# Patient Record
Sex: FEMALE | Race: WHITE | NOT HISPANIC OR LATINO | Employment: OTHER | URBAN - METROPOLITAN AREA
[De-identification: names, ages, dates, MRNs, and addresses within clinical notes are randomized per-mention and may not be internally consistent; named-entity substitution may affect disease eponyms.]

---

## 2017-01-17 ENCOUNTER — OFFICE VISIT (OUTPATIENT)
Dept: SPORTS MEDICINE | Facility: CLINIC | Age: 52
End: 2017-01-17

## 2017-01-17 VITALS
HEART RATE: 90 BPM | TEMPERATURE: 98.3 F | OXYGEN SATURATION: 97 % | SYSTOLIC BLOOD PRESSURE: 120 MMHG | DIASTOLIC BLOOD PRESSURE: 80 MMHG | BODY MASS INDEX: 29.71 KG/M2 | WEIGHT: 174 LBS | HEIGHT: 64 IN

## 2017-01-17 DIAGNOSIS — M75.42 SHOULDER IMPINGEMENT, LEFT: Primary | ICD-10-CM

## 2017-01-17 PROCEDURE — 20610 DRAIN/INJ JOINT/BURSA W/O US: CPT | Performed by: FAMILY MEDICINE

## 2017-01-17 PROCEDURE — 99213 OFFICE O/P EST LOW 20 MIN: CPT | Performed by: FAMILY MEDICINE

## 2017-01-17 PROCEDURE — 73030 X-RAY EXAM OF SHOULDER: CPT | Performed by: FAMILY MEDICINE

## 2017-01-17 RX ORDER — TRIAMCINOLONE ACETONIDE 40 MG/ML
40 INJECTION, SUSPENSION INTRA-ARTICULAR; INTRAMUSCULAR ONCE
Status: COMPLETED | OUTPATIENT
Start: 2017-01-17 | End: 2017-01-17

## 2017-01-17 RX ADMIN — TRIAMCINOLONE ACETONIDE 40 MG: 40 INJECTION, SUSPENSION INTRA-ARTICULAR; INTRAMUSCULAR at 16:23

## 2017-01-17 NOTE — MR AVS SNAPSHOT
Lynn MATTHEWS Speaker   1/17/2017 3:40 PM   Office Visit    Dept Phone:  [phone]   Encounter #:  [id number]    Provider:  Nolberto Colón MD   Department:  [facility]                Your Full Care Plan              Your Updated Medication List          This list is accurate as of: 1/17/17  4:14 PM.  Always use your most recent med list.                buPROPion  MG 24 hr tablet   Commonly known as:  WELLBUTRIN XL   TAKE 1 TABLET BY MOUTH EVERY MORNING.       citalopram 40 MG tablet   Commonly known as:  CeleXA   TAKE 1 TABLET DAILY.       zolpidem 10 MG tablet   Commonly known as:  AMBIEN   TAKE 1 TABLET BY MOUTH AT BEDTIME AS NEEDED FOR SLEEP               We Performed the Following     XR Shoulder 2+ View Left       You Were Diagnosed With        Codes Comments    Chronic left shoulder pain    -  Primary ICD-10-CM: M25.512, G89.29  ICD-9-CM: 719.41, 338.29       Instructions     None    Patient Instructions History      Upcoming Appointments     Visit Type Date Time Department    NEW PT - SPORTS MEDICINE 1/17/2017  3:40 PM [facility] [place], [place] SPORTS MED [facility]      [employer] Signup     Our records indicate that you have an active [employer] account.    You can view your After Visit Summary by going to [employer] and logging in with your [employer] username and password.  If you don't have a [employer] username and password but a parent or guardian has access to your record, the parent or guardian should login with their own [employer] username and password and access your record to view the After Visit Summary.    If you have questions, you can email [email or web address] or call [phone] to talk to our [employer] staff.  Remember, [employer] is NOT to be used for urgent needs.  For medical emergencies, dial 911.               Other Info from Your Visit           Allergies     No Known Drug Allergy        Reason for Visit     "Establish Care left shoulder cuff; pain;       Vital Signs     Blood Pressure Pulse Temperature Height Weight Oxygen Saturation    120/80 (BP Location: Left arm, Patient Position: Sitting, Cuff Size: Adult) 90 98.3 °F (36.8 °C) (Oral) 64\" (162.6 cm) 174 lb (78.9 kg) 97%    Body Mass Index Smoking Status                29.87 kg/m2 Never Smoker          Problems and Diagnoses Noted     Chronic left shoulder pain    -  Primary        "

## 2017-01-17 NOTE — PROGRESS NOTES
"Subjective   Lynn R Speaker is a 51 y.o. female.   CC: L shoulder pain  History of Present Illness   1.  October 2016, L shoulder pain. NKT but may have happened working in the yard, started hurting the next day. Dull pain. Pain if sleeps on tbhe L side. Advil helps some. No neck pain or paresthesias.     I have reviewed the patient's medical history in detail and updated the computerized patient record.        Review of Systems   Constitutional: Negative for fever.   Musculoskeletal:        Per HPI   Skin: Negative for wound.   Neurological: Negative for numbness.   All other systems reviewed and are negative.    Visit Vitals   • /80 (BP Location: Left arm, Patient Position: Sitting, Cuff Size: Adult)   • Pulse 90   • Temp 98.3 °F (36.8 °C) (Oral)   • Ht 64\" (162.6 cm)   • Wt 174 lb (78.9 kg)   • SpO2 97%   • BMI 29.87 kg/m2       Objective   Physical Exam   Constitutional: She is oriented to person, place, and time. She appears well-developed and well-nourished.   HENT:   Head: Normocephalic and atraumatic.   Eyes: Conjunctivae and EOM are normal. Pupils are equal, round, and reactive to light.   Cardiovascular:   No peripheral edema   Pulmonary/Chest: Effort normal.   Musculoskeletal:   Left shoulder normal in general appearance.  Patient has some mild tenderness to palpation over the infraspinatus footprint.  Also some mild to his palpation of the subacromial bursa.  Positive Neer and Cheek.  Negative [place].  Negative sulcus.  No tenderness to palpation of the biceps tendon.  Patient has full range of motion but begins to get pain over the superior lateral shoulder with abduction past 90°.  Motor 5 out of 5.  No pain with resisted external rotation.   Neurological: She is alert and oriented to person, place, and time.   Skin: Skin is warm and dry.   Psychiatric: She has a normal mood and affect. Her behavior is normal.   Vitals reviewed.  Left Shoulder X-Ray  Indication: Pain  AP Internal and " "External Rotation views    Findings:  No fracture  No bony lesion  Normal soft tissues  Normal joint spaces, mild acromioclavicular arthritic changes.    No prior studies were available for comparison.    Shoulder Injection Procedure Note    Left shoulder subacromial bursa injection was discussed with the patient in detail, including indication, risks, benefits, and alternatives. Verbal consent was given for the procedure.  Injection site was identified by physical examination and cleaned with Betadine and alcohol swabs. Prior to needle insertion, ethyl chloride spray was used for surface anesthesia. Sterile technique was used.  A 25-gauge, 1.5\" needle was directed to the joint from a(n) posterior approach. Injectate was passed into the subacromial bursa without difficulty. The needle was removed and a simple bandage was applied. The procedure was tolerated well without difficulty.    Injection mixture:  1% lidocaine without epinephrine: 3 mL    40 mg/mL triamcinolone acetonide: 1 mL    Assessment/Plan   Lynn was seen today for establish care.    Diagnoses and all orders for this visit:    Shoulder impingement, left  -     XR Shoulder 2+ View Left        Postinjection instructions regarding ice and activity was discussed with the patient.  Patient also given home exercise program.  Follow-up 3 weeks if no improvement.       "

## 2017-02-07 ENCOUNTER — OFFICE VISIT (OUTPATIENT)
Dept: FAMILY MEDICINE CLINIC | Facility: CLINIC | Age: 52
End: 2017-02-07

## 2017-02-07 VITALS
OXYGEN SATURATION: 96 % | BODY MASS INDEX: 29.18 KG/M2 | SYSTOLIC BLOOD PRESSURE: 112 MMHG | TEMPERATURE: 98.4 F | DIASTOLIC BLOOD PRESSURE: 80 MMHG | HEART RATE: 89 BPM | RESPIRATION RATE: 16 BRPM | WEIGHT: 170 LBS

## 2017-02-07 DIAGNOSIS — J10.1 INFLUENZA A: ICD-10-CM

## 2017-02-07 DIAGNOSIS — R68.89 FLU-LIKE SYMPTOMS: Primary | ICD-10-CM

## 2017-02-07 LAB
EXPIRATION DATE: ABNORMAL
FLUAV AG NPH QL: POSITIVE
FLUBV AG NPH QL: NEGATIVE
INTERNAL CONTROL: ABNORMAL
Lab: ABNORMAL

## 2017-02-07 PROCEDURE — 99213 OFFICE O/P EST LOW 20 MIN: CPT | Performed by: NURSE PRACTITIONER

## 2017-02-07 PROCEDURE — 87804 INFLUENZA ASSAY W/OPTIC: CPT | Performed by: NURSE PRACTITIONER

## 2017-02-07 RX ORDER — OSELTAMIVIR PHOSPHATE 75 MG/1
75 CAPSULE ORAL 2 TIMES DAILY
Qty: 10 CAPSULE | Refills: 0 | Status: SHIPPED | OUTPATIENT
Start: 2017-02-07 | End: 2017-02-12

## 2017-02-07 NOTE — PROGRESS NOTES
Dima Gomez is a 51 y.o. female.   Chief Complaint   Patient presents with   • Flu Symptoms     x 3 days, n/v, fatigue, hot flashes     Vitals:    02/07/17 0932   BP: 112/80   Pulse: 89   Resp: 16   Temp: 98.4 °F (36.9 °C)   SpO2: 96%     Allergies   Allergen Reactions   • No Known Drug Allergy      HPI Comments: Did not get flu shot. Has had sick contacts    URI    This is a new problem. The current episode started in the past 7 days (day three today). The problem has been gradually worsening. The maximum temperature recorded prior to her arrival was 100.4 - 100.9 F. Associated symptoms include congestion, coughing (non-productive), headaches, joint pain (achy), nausea and sinus pain. Pertinent negatives include no sore throat. She has tried decongestant, acetaminophen and NSAIDs for the symptoms. The treatment provided moderate relief.        The following portions of the patient's history were reviewed and updated as appropriate: allergies, current medications, past family history, past medical history, past social history, past surgical history and problem list.    Review of Systems   Constitutional: Positive for chills, diaphoresis, fatigue and fever.   HENT: Positive for congestion and sinus pressure. Negative for sore throat.    Respiratory: Positive for cough (non-productive).    Gastrointestinal: Positive for nausea.   Musculoskeletal: Positive for joint pain (achy) and myalgias (achy).   Neurological: Positive for headaches.   All other systems reviewed and are negative.      Objective   Physical Exam   Constitutional: She is oriented to person, place, and time. She appears well-developed and well-nourished.   HENT:   Head: Normocephalic and atraumatic.   Right Ear: External ear normal. No tenderness. Tympanic membrane is bulging. Tympanic membrane is not erythematous.   Left Ear: External ear normal. No tenderness. Tympanic membrane is bulging. Tympanic membrane is not erythematous.    Mouth/Throat: Oropharynx is clear and moist.   Neck: Normal range of motion. Neck supple.   Cardiovascular: Normal rate, regular rhythm and normal heart sounds.    Pulmonary/Chest: Effort normal and breath sounds normal.   Musculoskeletal: Normal range of motion.   Neurological: She is alert and oriented to person, place, and time.   Skin: Skin is warm and dry.   Psychiatric: She has a normal mood and affect. Her behavior is normal. Judgment and thought content normal.   Nursing note and vitals reviewed.      Assessment/Plan   Problems Addressed this Visit     None      Visit Diagnoses     Flu-like symptoms    -  Primary    Relevant Orders    POC Influenza A / B (Completed)    Influenza A        Relevant Medications    oseltamivir (TAMIFLU) 75 MG capsule        Results for orders placed or performed in visit on 02/07/17   POC Influenza A / B   Result Value Ref Range    Rapid Influenza A Ag Positive     Rapid Influenza B Ag Negative     Internal Control Passed Passed    Lot Number [id number]     Expiration Date 2017-07-31

## 2017-02-08 ENCOUNTER — TRANSCRIBE ORDERS (OUTPATIENT)
Dept: ADMINISTRATIVE | Facility: HOSPITAL | Age: 52
End: 2017-02-08

## 2017-02-08 DIAGNOSIS — Z13.9 SCREENING: Primary | ICD-10-CM

## 2017-06-03 DIAGNOSIS — F41.9 ANXIETY AND DEPRESSION: ICD-10-CM

## 2017-06-03 DIAGNOSIS — F32.A ANXIETY AND DEPRESSION: ICD-10-CM

## 2017-06-05 RX ORDER — BUPROPION HYDROCHLORIDE 150 MG/1
TABLET ORAL
Qty: 30 TABLET | Refills: 1 | Status: SHIPPED | OUTPATIENT
Start: 2017-06-05 | End: 2017-07-12 | Stop reason: SDUPTHER

## 2017-06-12 DIAGNOSIS — F41.9 ANXIETY AND DEPRESSION: ICD-10-CM

## 2017-06-12 DIAGNOSIS — F32.A ANXIETY AND DEPRESSION: ICD-10-CM

## 2017-06-12 RX ORDER — CITALOPRAM 40 MG/1
TABLET ORAL
Qty: 30 TABLET | Refills: 6 | Status: SHIPPED | OUTPATIENT
Start: 2017-06-12 | End: 2017-07-12 | Stop reason: SDUPTHER

## 2017-07-12 DIAGNOSIS — F41.9 ANXIETY AND DEPRESSION: ICD-10-CM

## 2017-07-12 DIAGNOSIS — F32.A ANXIETY AND DEPRESSION: ICD-10-CM

## 2017-07-12 RX ORDER — CITALOPRAM 40 MG/1
40 TABLET ORAL DAILY
Qty: 90 TABLET | Refills: 1 | Status: SHIPPED | OUTPATIENT
Start: 2017-07-12 | End: 2017-08-31 | Stop reason: SDUPTHER

## 2017-07-12 RX ORDER — BUPROPION HYDROCHLORIDE 150 MG/1
150 TABLET ORAL EVERY MORNING
Qty: 90 TABLET | Refills: 1 | Status: SHIPPED | OUTPATIENT
Start: 2017-07-12 | End: 2017-08-31

## 2017-07-13 ENCOUNTER — OFFICE VISIT (OUTPATIENT)
Dept: FAMILY MEDICINE CLINIC | Facility: CLINIC | Age: 52
End: 2017-07-13

## 2017-07-13 VITALS
OXYGEN SATURATION: 96 % | SYSTOLIC BLOOD PRESSURE: 120 MMHG | HEIGHT: 64 IN | RESPIRATION RATE: 16 BRPM | BODY MASS INDEX: 28.34 KG/M2 | TEMPERATURE: 98.5 F | HEART RATE: 86 BPM | DIASTOLIC BLOOD PRESSURE: 84 MMHG | WEIGHT: 166 LBS

## 2017-07-13 DIAGNOSIS — F51.01 PRIMARY INSOMNIA: Primary | ICD-10-CM

## 2017-07-13 DIAGNOSIS — F32.A ANXIETY AND DEPRESSION: ICD-10-CM

## 2017-07-13 DIAGNOSIS — K14.9 TONGUE IRRITATION: ICD-10-CM

## 2017-07-13 DIAGNOSIS — F41.9 ANXIETY AND DEPRESSION: ICD-10-CM

## 2017-07-13 PROCEDURE — 99213 OFFICE O/P EST LOW 20 MIN: CPT | Performed by: PHYSICIAN ASSISTANT

## 2017-07-13 NOTE — PROGRESS NOTES
Subjective   Lynn Gomez is a 52 y.o. female.   Chief Complaint   Patient presents with   • Insomnia       History of Present Illness     Lynn is a 52 year old female who presents for follow up on insomnia management.Lynn has lost 4 pounds since February 2017. States she has not been taking her Ambien medication due to making her forgetful.  Lynn has had a burning sensation on her tongue for the past month or so. Denied any smoking history.  Denied any sore throat,post nasal dip,sinus pressure or sores in mouth/tongues.  Brushes teeth once-twice a day.   States she has not sensitivity to cold/hot/foods.  .  Last dental exam was 6  months ago.   Diet has been normal. Sleep has been restless.  She has tried OTC Melatonin without relief of symptoms.  She has only been prescribed Ambien for insomnia.  Lynn is doing well with the Celexa and Wellbutrin medications.  Denied any suicidal or homicidal thoughts.      The following portions of the patient's history were reviewed and updated as appropriate: allergies, current medications, past family history, past medical history, past social history and past surgical history.    Review of Systems   Constitutional: Negative.    HENT: Negative.  Negative for postnasal drip, sinus pressure, sore throat, trouble swallowing and voice change.    Eyes: Negative.    Respiratory: Negative.  Negative for cough, shortness of breath and wheezing.    Cardiovascular: Negative.  Negative for chest pain, palpitations and leg swelling.   Gastrointestinal: Negative.    Endocrine: Negative.    Genitourinary: Negative.    Musculoskeletal: Negative.    Skin: Negative.    Allergic/Immunologic: Negative.    Neurological: Negative.    Hematological: Negative.    Psychiatric/Behavioral: Positive for sleep disturbance. Negative for suicidal ideas. The patient is not nervous/anxious.    All other systems reviewed and are negative.    Vitals:    07/13/17 1058   BP: 120/84   BP Location: Left  "arm   Patient Position: Sitting   Cuff Size: Large Adult   Pulse: 86   Resp: 16   Temp: 98.5 °F (36.9 °C)   TempSrc: Oral   SpO2: 96%   Weight: 166 lb (75.3 kg)   Height: 64\" (162.6 cm)     Wt Readings from Last 3 Encounters:   07/13/17 166 lb (75.3 kg)   02/07/17 170 lb (77.1 kg)   01/17/17 174 lb (78.9 kg)       BP Readings from Last 3 Encounters:   07/13/17 120/84   02/07/17 112/80   01/17/17 120/80     Body mass index is 28.49 kg/(m^2).  Allergies   Allergen Reactions   • No Known Drug Allergy        Objective   Physical Exam   Constitutional: She is oriented to person, place, and time. Vital signs are normal. She appears well-developed and well-nourished.   HENT:   Head: Normocephalic and atraumatic.   Right Ear: Hearing, tympanic membrane, external ear and ear canal normal.   Left Ear: Hearing, tympanic membrane, external ear and ear canal normal.   Nose: Nose normal. Right sinus exhibits no maxillary sinus tenderness and no frontal sinus tenderness. Left sinus exhibits no maxillary sinus tenderness and no frontal sinus tenderness.   Mouth/Throat: Uvula is midline, oropharynx is clear and moist and mucous membranes are normal. No oral lesions. No trismus in the jaw. Normal dentition. No uvula swelling, lacerations or dental caries.       Eyes: Conjunctivae, EOM and lids are normal. Pupils are equal, round, and reactive to light.   Neck: Trachea normal and phonation normal. Neck supple. No edema present. No thyromegaly present.   Cardiovascular: Normal rate, regular rhythm, S1 normal, S2 normal, normal heart sounds and normal pulses.    Pulmonary/Chest: Effort normal and breath sounds normal.   Abdominal: Soft. Normal appearance, normal aorta and bowel sounds are normal. There is no hepatomegaly. There is no tenderness.   Lymphadenopathy:     She has no cervical adenopathy.   Neurological: She is alert and oriented to person, place, and time.   Skin: Skin is warm, dry and intact.   Psychiatric: She has a normal " mood and affect. Her speech is normal and behavior is normal. Judgment and thought content normal. Cognition and memory are normal.       Assessment/Plan   Lynn was seen today for insomnia.    Diagnoses and all orders for this visit:    Primary insomnia  -     zaleplon (SONATA) 5 MG capsule; Take 1 capsule by mouth Every Night.    Anxiety and depression    Tongue irritation      1. Chronic insomnia: Lynn has stop taking her Ambien due to making her forgetful.  I have suggested using still not prescription.  Dr. Bah has approved this medication.  See below for Jorge information.  Lynn will return to office in 4 weeks for reevaluation.  2.  Stable anxiety and depression: She is doing well with her Wellbutrin and citalopram medications.  No refills are required.  I've asked her to return to office in 4 months for reevaluation.  3.  New tongue irritation: On physical examination today her tongue seems slightly inflamed.  I suspect this may be related to silent reflux.  I've asked wanted to use over-the-counter Zantac twice a day for the next 3 weeks.  She will keep her appointment with her dentist in August as well.  I've encouraged her to decrease her caffeine and acidic food intake.  She voiced understanding.  Lynn will return to office if no improvement.      As part of this patient's treatment plan I am prescribing controlled substances.  The patient has been made aware of appropriate use of such medications, including potential risk of somnolence. Limited ability to drive and/or work safely, and potential for dependence or overdose.  It has also been made clear that these medications are for use by this patient only, without concomitant use of alcohol or other substances unless prescribed.  JORGE report has been reviewed and scanned into the patient's chart.  Jorge number Is [id number] dated July 11, 2017.    Dragon transcription disclaimer    Much of this encounter note is an electronic  transcription/translation of spoken language to printed text.  The electronic translation of spoken language may permit erroneous, or at times, nonsensical words or phrases to be inadvertently transcribed.  Although I have reviewed the note for such errors, some may still exist.    Anabella Farris PA-C  Family Practice

## 2017-07-17 RX ORDER — ZALEPLON 5 MG/1
5 CAPSULE ORAL NIGHTLY
Qty: 30 CAPSULE | Refills: 0
Start: 2017-07-17 | End: 2017-08-10

## 2017-07-17 NOTE — PROGRESS NOTES
I have reviewed the notes, assessments, and/or procedures performed by Anabella Farris PA-C, I concur with her/his documentation of Lynn Quinones

## 2017-08-10 DIAGNOSIS — F51.01 PRIMARY INSOMNIA: Primary | ICD-10-CM

## 2017-08-10 RX ORDER — ESZOPICLONE 2 MG/1
2 TABLET, FILM COATED ORAL NIGHTLY
Qty: 30 TABLET | Refills: 0 | Status: SHIPPED | OUTPATIENT
Start: 2017-08-10 | End: 2017-08-23 | Stop reason: SINTOL

## 2017-08-10 NOTE — TELEPHONE ENCOUNTER
notify patient that I have sent a prescription for Lunesta in to pharmacy.  She has been on Ambien the Profore in the past but stopped due to memory issues.

## 2017-08-14 ENCOUNTER — HOSPITAL ENCOUNTER (OUTPATIENT)
Dept: MAMMOGRAPHY | Facility: HOSPITAL | Age: 52
Discharge: HOME | End: 2017-08-14
Attending: OBSTETRICS & GYNECOLOGY | Admitting: OBSTETRICS & GYNECOLOGY

## 2017-08-14 DIAGNOSIS — Z13.9 SCREENING: ICD-10-CM

## 2017-08-14 PROCEDURE — G0202 SCR MAMMO BI INCL CAD: HCPCS

## 2017-08-21 ENCOUNTER — TELEPHONE (OUTPATIENT)
Dept: FAMILY MEDICINE CLINIC | Facility: CLINIC | Age: 52
End: 2017-08-21

## 2017-08-21 NOTE — TELEPHONE ENCOUNTER
Pt left a message stating that she suspects her Lunesta is interacting with her antidepressants and she would like to go back to Ambien if you have no other options for her to try. Please advise

## 2017-08-23 DIAGNOSIS — F51.01 PRIMARY INSOMNIA: Primary | ICD-10-CM

## 2017-08-23 RX ORDER — HYDROXYZINE HYDROCHLORIDE 25 MG/1
TABLET, FILM COATED ORAL
Qty: 60 TABLET | Refills: 0 | Status: SHIPPED | OUTPATIENT
Start: 2017-08-23 | End: 2017-09-22 | Stop reason: SDUPTHER

## 2017-08-23 NOTE — TELEPHONE ENCOUNTER
I spoke with patient on August 23, 2017 at 5 PM.  She has been on Lunesta, Sonta and Ambien for insomnia issues.  She had side effects with all medications.  I have sent to pharmacy hydroxyzine to take one to 2 tablets at bedtime to see if this helps with her insomnia.  We'll consider referral to sleep study if no improvement.

## 2017-08-31 ENCOUNTER — OFFICE VISIT (OUTPATIENT)
Dept: FAMILY MEDICINE CLINIC | Facility: CLINIC | Age: 52
End: 2017-08-31

## 2017-08-31 VITALS
BODY MASS INDEX: 29.37 KG/M2 | SYSTOLIC BLOOD PRESSURE: 120 MMHG | TEMPERATURE: 98.3 F | HEIGHT: 64 IN | RESPIRATION RATE: 16 BRPM | WEIGHT: 172 LBS | OXYGEN SATURATION: 100 % | HEART RATE: 83 BPM | DIASTOLIC BLOOD PRESSURE: 84 MMHG

## 2017-08-31 DIAGNOSIS — F32.A ANXIETY AND DEPRESSION: ICD-10-CM

## 2017-08-31 DIAGNOSIS — F51.01 PRIMARY INSOMNIA: Primary | ICD-10-CM

## 2017-08-31 DIAGNOSIS — F41.9 ANXIETY AND DEPRESSION: ICD-10-CM

## 2017-08-31 PROCEDURE — 99213 OFFICE O/P EST LOW 20 MIN: CPT | Performed by: PHYSICIAN ASSISTANT

## 2017-08-31 RX ORDER — BUPROPION HCL 150 MG
150 TABLET, EXTENDED RELEASE 24 HR ORAL DAILY
Qty: 90 TABLET | Refills: 2 | Status: SHIPPED | OUTPATIENT
Start: 2017-08-31 | End: 2017-09-06

## 2017-08-31 RX ORDER — CITALOPRAM 40 MG/1
40 TABLET ORAL DAILY
Qty: 90 TABLET | Refills: 2 | Status: SHIPPED | OUTPATIENT
Start: 2017-08-31 | End: 2018-03-28 | Stop reason: SDUPTHER

## 2017-09-01 RX ORDER — ZOLPIDEM TARTRATE 10 MG/1
10 TABLET ORAL NIGHTLY PRN
Qty: 90 TABLET | Refills: 2
Start: 2017-09-01 | End: 2018-03-28 | Stop reason: SDUPTHER

## 2017-09-05 ENCOUNTER — OFFICE VISIT (OUTPATIENT)
Dept: OBSTETRICS AND GYNECOLOGY | Facility: CLINIC | Age: 52
End: 2017-09-05

## 2017-09-05 VITALS
DIASTOLIC BLOOD PRESSURE: 70 MMHG | WEIGHT: 174 LBS | SYSTOLIC BLOOD PRESSURE: 116 MMHG | HEIGHT: 64 IN | BODY MASS INDEX: 29.71 KG/M2

## 2017-09-05 DIAGNOSIS — Z01.419 ENCOUNTER FOR GYNECOLOGICAL EXAMINATION WITH PAPANICOLAOU SMEAR OF CERVIX: Primary | ICD-10-CM

## 2017-09-05 LAB
BILIRUB BLD-MCNC: NEGATIVE MG/DL
CLARITY, POC: CLEAR
COLOR UR: YELLOW
GLUCOSE UR STRIP-MCNC: NEGATIVE MG/DL
KETONES UR QL: NEGATIVE
LEUKOCYTE EST, POC: NEGATIVE
NITRITE UR-MCNC: NEGATIVE MG/ML
PH UR: 5 [PH] (ref 5–8)
PROT UR STRIP-MCNC: NEGATIVE MG/DL
RBC # UR STRIP: NEGATIVE /UL
SP GR UR: 1 (ref 1–1.03)
UROBILINOGEN UR QL: NORMAL

## 2017-09-05 PROCEDURE — 81002 URINALYSIS NONAUTO W/O SCOPE: CPT | Performed by: OBSTETRICS & GYNECOLOGY

## 2017-09-05 PROCEDURE — 99396 PREV VISIT EST AGE 40-64: CPT | Performed by: OBSTETRICS & GYNECOLOGY

## 2017-09-05 NOTE — PROGRESS NOTES
GYN Annual Exam     CC- Here for annual exam.     Lynn MATTHEWS Speaker is a 52 y.o. female who presents for annual well woman exam. Periods are absent.     OB History      Para Term  AB TAB SAB Ectopic Multiple Living    1 1 1                 Current contraception: none  History of abnormal Pap smear: no  Family history of uterine, colon or ovarian cancer: no  History of abnormal mammogram: no  Family history of breast cancer: no  Last Pap :     Past Medical History:   Diagnosis Date   • Anxiety    • Depression        Past Surgical History:   Procedure Laterality Date   •  SECTION     • ENDOMETRIAL ABLATION W/ NOVASURE     • REDUCTION MAMMAPLASTY Bilateral              Current Outpatient Prescriptions:   •  citalopram (CeleXA) 40 MG tablet, Take 1 tablet by mouth Daily., Disp: 90 tablet, Rfl: 2  •  hydrOXYzine (ATARAX) 25 MG tablet, Take 1-2 tablets at bedtime for insomnia, Disp: 60 tablet, Rfl: 0  •  WELLBUTRIN  MG 24 hr tablet, Take 1 tablet by mouth Daily. BAYRON/DNS, Disp: 90 tablet, Rfl: 2  •  zolpidem (AMBIEN) 10 MG tablet, Take 1 tablet by mouth At Night As Needed for Sleep., Disp: 90 tablet, Rfl: 2    Allergies   Allergen Reactions   • No Known Drug Allergy        Social History   Substance Use Topics   • Smoking status: Never Smoker   • Smokeless tobacco: Never Used   • Alcohol use Yes      Comment: occasional       Family History   Problem Relation Age of Onset   • Hypertension Father    • Hypertension Paternal Grandmother        Review of Systems   Constitutional: Negative for appetite change, fever and unexpected weight change.   Respiratory: Negative for cough and shortness of breath.    Cardiovascular: Negative for chest pain and palpitations.   Gastrointestinal: Negative for abdominal distention, abdominal pain, constipation, diarrhea, nausea and vomiting.   Endocrine: Negative.    Genitourinary: Negative for dyspareunia, menstrual problem, pelvic pain and vaginal  "discharge.   Skin: Negative.    Hematological: Negative.    Psychiatric/Behavioral: Negative for dysphoric mood and sleep disturbance. The patient is not nervous/anxious.        /70  Ht 64\" (162.6 cm)  Wt 174 lb (78.9 kg)  LMP  (Approximate)  Breastfeeding? No  BMI 29.87 kg/m2    Physical Exam   Constitutional: She is oriented to person, place, and time. She appears well-developed and well-nourished.   HENT:   Head: Normocephalic and atraumatic.   Neck: Normal range of motion. Neck supple. No thyromegaly present.   Cardiovascular: Normal rate and regular rhythm.    Pulmonary/Chest: Effort normal and breath sounds normal. Right breast exhibits no mass and no nipple discharge. Left breast exhibits no mass and no nipple discharge. Breasts are symmetrical. There is no breast swelling.   Abdominal: Soft. Bowel sounds are normal. She exhibits no distension and no mass. There is no tenderness. There is no rebound and no guarding.   Genitourinary: Vagina normal and uterus normal. No breast tenderness, discharge or bleeding. Pelvic exam was performed with patient prone. There is no lesion on the right labia. There is no lesion on the left labia. Cervix exhibits no motion tenderness and no discharge. Right adnexum displays no mass. Left adnexum displays no mass.   Musculoskeletal: Normal range of motion. She exhibits no edema.   Neurological: She is alert and oriented to person, place, and time.   Skin: Skin is warm and dry.   Psychiatric: She has a normal mood and affect. Her behavior is normal. Judgment and thought content normal.   Nursing note and vitals reviewed.      Diagnoses and all orders for this visit:    Encounter for gynecological examination with Papanicolaou smear of cervix  -     POC Urinalysis Dipstick  -     Pap IG, HPV-hr        Assessment     1) GYN annual well woman exam.   2) Normal exam     Plan     1) Breast Health - Clinical breast exam & mammogram yearly, Self breast awareness monthly  2) " Pap - done today  3) Smoking status- Never smoker  4) Colon health - screening colonoscopy recommended if not up to date  5) Bone health - Weight bearing exercise, dietary calcium recommendations and vitamin D reviewed.   6) Seat belts recommended  7) Follow up prn and one year    Encounter Diagnoses   Name Primary?   • Encounter for gynecological examination with Papanicolaou smear of cervix Yes         Gualberto Correa MD  9/5/2017  1:40 PM

## 2017-09-06 ENCOUNTER — TELEPHONE (OUTPATIENT)
Dept: FAMILY MEDICINE CLINIC | Facility: CLINIC | Age: 52
End: 2017-09-06

## 2017-09-06 DIAGNOSIS — F41.9 ANXIETY AND DEPRESSION: Primary | ICD-10-CM

## 2017-09-06 DIAGNOSIS — F32.A ANXIETY AND DEPRESSION: Primary | ICD-10-CM

## 2017-09-06 RX ORDER — BUPROPION HYDROCHLORIDE 100 MG/1
100 TABLET, EXTENDED RELEASE ORAL 2 TIMES DAILY
Qty: 180 TABLET | Refills: 0 | Status: SHIPPED | OUTPATIENT
Start: 2017-09-06 | End: 2017-09-07 | Stop reason: SDUPTHER

## 2017-09-07 DIAGNOSIS — F41.9 ANXIETY AND DEPRESSION: Primary | ICD-10-CM

## 2017-09-07 DIAGNOSIS — F32.A ANXIETY AND DEPRESSION: Primary | ICD-10-CM

## 2017-09-07 LAB
CYTOLOGIST CVX/VAG CYTO: NORMAL
CYTOLOGY CVX/VAG DOC THIN PREP: NORMAL
DX ICD CODE: NORMAL
HIV 1 & 2 AB SER-IMP: NORMAL
HPV I/H RISK 1 DNA CVX QL PROBE+SIG AMP: NEGATIVE
OTHER STN SPEC: NORMAL
PATH REPORT.FINAL DX SPEC: NORMAL
STAT OF ADQ CVX/VAG CYTO-IMP: NORMAL

## 2017-09-07 RX ORDER — BUPROPION HYDROCHLORIDE 100 MG/1
100 TABLET, EXTENDED RELEASE ORAL 2 TIMES DAILY
Qty: 180 TABLET | Refills: 0 | Status: SHIPPED | OUTPATIENT
Start: 2017-09-07 | End: 2017-12-06 | Stop reason: SDUPTHER

## 2017-09-08 ENCOUNTER — TELEPHONE (OUTPATIENT)
Dept: FAMILY MEDICINE CLINIC | Facility: CLINIC | Age: 52
End: 2017-09-08

## 2017-09-08 NOTE — TELEPHONE ENCOUNTER
----- Message from Anabella Farris PA-C sent at 9/7/2017  4:52 PM EDT -----  Notify  patient that Pap smear was normal.

## 2017-09-22 DIAGNOSIS — F51.01 PRIMARY INSOMNIA: ICD-10-CM

## 2017-09-22 RX ORDER — HYDROXYZINE HYDROCHLORIDE 25 MG/1
TABLET, FILM COATED ORAL
Qty: 60 TABLET | Refills: 3 | Status: SHIPPED | OUTPATIENT
Start: 2017-09-22 | End: 2018-08-02

## 2017-11-27 RX ORDER — BUPROPION HYDROCHLORIDE 100 MG/1
TABLET, EXTENDED RELEASE ORAL
Qty: 180 TABLET | Refills: 0 | Status: SHIPPED | OUTPATIENT
Start: 2017-11-27 | End: 2018-01-24 | Stop reason: DRUGHIGH

## 2017-12-06 DIAGNOSIS — F32.A ANXIETY AND DEPRESSION: ICD-10-CM

## 2017-12-06 DIAGNOSIS — F41.9 ANXIETY AND DEPRESSION: ICD-10-CM

## 2017-12-06 RX ORDER — BUPROPION HYDROCHLORIDE 100 MG/1
TABLET, EXTENDED RELEASE ORAL
Qty: 180 TABLET | Refills: 0 | Status: SHIPPED | OUTPATIENT
Start: 2017-12-06 | End: 2018-01-24 | Stop reason: DRUGHIGH

## 2018-01-24 DIAGNOSIS — F32.A ANXIETY AND DEPRESSION: Primary | ICD-10-CM

## 2018-01-24 DIAGNOSIS — F41.9 ANXIETY AND DEPRESSION: Primary | ICD-10-CM

## 2018-01-24 RX ORDER — BUPROPION HYDROCHLORIDE 150 MG/1
150 TABLET ORAL DAILY
Qty: 30 TABLET | Refills: 0 | Status: SHIPPED | OUTPATIENT
Start: 2018-01-24 | End: 2018-02-24 | Stop reason: SDUPTHER

## 2018-02-24 DIAGNOSIS — F41.9 ANXIETY AND DEPRESSION: ICD-10-CM

## 2018-02-24 DIAGNOSIS — F32.A ANXIETY AND DEPRESSION: ICD-10-CM

## 2018-02-26 RX ORDER — BUPROPION HYDROCHLORIDE 150 MG/1
TABLET ORAL
Qty: 30 TABLET | Refills: 0 | Status: SHIPPED | OUTPATIENT
Start: 2018-02-26 | End: 2018-03-28 | Stop reason: SDUPTHER

## 2018-03-12 DIAGNOSIS — F51.01 PRIMARY INSOMNIA: ICD-10-CM

## 2018-03-12 RX ORDER — ZOLPIDEM TARTRATE 10 MG/1
TABLET ORAL
Qty: 90 TABLET | OUTPATIENT
Start: 2018-03-12

## 2018-03-28 ENCOUNTER — OFFICE VISIT (OUTPATIENT)
Dept: FAMILY MEDICINE CLINIC | Facility: CLINIC | Age: 53
End: 2018-03-28

## 2018-03-28 VITALS
HEART RATE: 115 BPM | TEMPERATURE: 98.5 F | WEIGHT: 175 LBS | OXYGEN SATURATION: 98 % | HEIGHT: 64 IN | RESPIRATION RATE: 16 BRPM | BODY MASS INDEX: 29.88 KG/M2 | DIASTOLIC BLOOD PRESSURE: 68 MMHG | SYSTOLIC BLOOD PRESSURE: 120 MMHG

## 2018-03-28 DIAGNOSIS — F51.01 PRIMARY INSOMNIA: Primary | ICD-10-CM

## 2018-03-28 DIAGNOSIS — F41.9 ANXIETY AND DEPRESSION: ICD-10-CM

## 2018-03-28 DIAGNOSIS — F32.A ANXIETY AND DEPRESSION: ICD-10-CM

## 2018-03-28 DIAGNOSIS — Z79.899 HIGH RISK MEDICATION USE: ICD-10-CM

## 2018-03-28 PROCEDURE — 99213 OFFICE O/P EST LOW 20 MIN: CPT | Performed by: PHYSICIAN ASSISTANT

## 2018-03-28 RX ORDER — BUPROPION HYDROCHLORIDE 150 MG/1
150 TABLET ORAL DAILY
Qty: 90 TABLET | Refills: 3 | Status: SHIPPED | OUTPATIENT
Start: 2018-03-28 | End: 2018-03-28 | Stop reason: SDUPTHER

## 2018-03-28 RX ORDER — CITALOPRAM 40 MG/1
40 TABLET ORAL DAILY
Qty: 90 TABLET | Refills: 3 | Status: SHIPPED | OUTPATIENT
Start: 2018-03-28 | End: 2019-04-01

## 2018-03-28 RX ORDER — BUPROPION HYDROCHLORIDE 150 MG/1
150 TABLET ORAL DAILY
Qty: 90 TABLET | Refills: 3 | Status: SHIPPED | OUTPATIENT
Start: 2018-03-28 | End: 2019-04-01 | Stop reason: SDUPTHER

## 2018-03-28 NOTE — PROGRESS NOTES
"Subjective   Lynn R Speaker is a 52 y.o. female.   Chief Complaint   Patient presents with   • Insomnia     management       History of Present Illness     Lynn is a  52-year-old female who presents for insomnia management.  Currently taking Ambien 10 mg once a bedtime.  States the medication helps get her to sleep and stay asleep.   No side effects to the medication.  Diet has been normal. Denied any suicidal or homicidal thoughts. Lynn arenasas not been exercising regularly.  Denied any chest pain,shortness for air,wheezing or swelling of ankles.      The following portions of the patient's history were reviewed and updated as appropriate: allergies, current medications, past family history, past medical history, past social history and past surgical history.    Review of Systems   Constitutional: Negative.    HENT: Negative.    Eyes: Negative.    Respiratory: Negative.    Cardiovascular: Negative.    Gastrointestinal: Negative.    Endocrine: Negative.    Genitourinary: Negative.    Musculoskeletal: Negative.    Skin: Negative.    Allergic/Immunologic: Negative.    Neurological: Negative.    Hematological: Negative.    Psychiatric/Behavioral: Positive for sleep disturbance. Negative for suicidal ideas. The patient is nervous/anxious.    All other systems reviewed and are negative.      Social History     Social History   • Marital status: Single     Social History Main Topics   • Smoking status: Never Smoker   • Smokeless tobacco: Never Used   • Alcohol use Yes      Comment: occasional   • Drug use: No   • Sexual activity: No     Other Topics Concern   • Not on file     Vitals:    03/28/18 1313   BP: 120/68   BP Location: Right arm   Patient Position: Sitting   Cuff Size: Adult   Pulse: 115   Resp: 16   Temp: 98.5 °F (36.9 °C)   TempSrc: Oral   SpO2: 98%   Weight: 79.4 kg (175 lb)   Height: 162.6 cm (64\")       Wt Readings from Last 3 Encounters:   03/28/18 79.4 kg (175 lb)   09/05/17 78.9 kg (174 lb)   08/31/17 " 78 kg (172 lb)       BP Readings from Last 3 Encounters:   03/28/18 120/68   09/05/17 116/70   08/31/17 120/84     Body mass index is 30.04 kg/m².  Allergies   Allergen Reactions   • No Known Drug Allergy        Objective   Physical Exam   Constitutional: She is oriented to person, place, and time. Vital signs are normal. She appears well-developed and well-nourished.   Neck: Trachea normal and phonation normal. Neck supple. No edema present.   Cardiovascular: Normal rate, regular rhythm, S1 normal, S2 normal, normal heart sounds and normal pulses.    Pulmonary/Chest: Effort normal and breath sounds normal.   Abdominal: Soft. Normal appearance and bowel sounds are normal. There is no hepatomegaly. There is no tenderness.   Neurological: She is alert and oriented to person, place, and time.   Skin: Skin is warm, dry and intact.   Psychiatric: She has a normal mood and affect. Her speech is normal and behavior is normal. Judgment and thought content normal. Cognition and memory are normal.       Assessment/Plan   Lynn was seen today for insomnia.    Diagnoses and all orders for this visit:    Primary insomnia  -     Zolpidem (Ambien), Serum  -     zolpidem (AMBIEN) 10 MG tablet; Take 1 tablet by mouth At Night As Needed for Sleep.    High risk medication use  -     Zolpidem (Ambien), Serum    Anxiety and depression  -     Discontinue: buPROPion XL (WELLBUTRIN XL) 150 MG 24 hr tablet; Take 1 tablet by mouth Daily.  -     Discontinue: buPROPion XL (WELLBUTRIN XL) 150 MG 24 hr tablet; Take 1 tablet by mouth Daily.  -     citalopram (CeleXA) 40 MG tablet; Take 1 tablet by mouth Daily.        1.  Chronic and Stable insomnia: Lynn is doing well with her Ambien medication.  She has signed the appropriate agreement and consent forms for the medication.  Dr. Tim Bah has approved a refill on Ambien medication.  See below for Juan Daniel information. Lynn will have an Ambien blood work collected at today's office visit  for verification of medication usage.  She'll be notified test results when completed.  She will schedule follow-up appointment in 6 months for reevaluation.  2.  Chronic and stable anxiety with depression: She is doing well with the Wellbutrin and citalopram medication.  I have sent refills to her mail-order pharmacy.      As part of this patient's treatment plan I am prescribing controlled substances.  The patient has been made aware of appropriate use of such medications, including potential risk of somnolence. Limited ability to drive and/or work safely, and potential for dependence or overdose.  It has also been made clear that these medications are for use by this patient only, without concomitant use of alcohol or other substances unless prescribed.  JORGE report has been reviewed and scanned into the patient's chart.  Jorge number is [id number] dated March 26, 2018.

## 2018-03-29 PROBLEM — Z79.899 HIGH RISK MEDICATION USE: Status: ACTIVE | Noted: 2018-03-29

## 2018-03-29 RX ORDER — ZOLPIDEM TARTRATE 10 MG/1
10 TABLET ORAL NIGHTLY PRN
Qty: 90 TABLET | Refills: 0
Start: 2018-03-29 | End: 2018-05-03 | Stop reason: SDUPTHER

## 2018-03-29 NOTE — PROGRESS NOTES
I have reviewed the notes, assessments, and/or procedures performed by ESHA Martin, I concur with her/his documentation of.Lynn Gomez.

## 2018-04-04 LAB — ZOLPIDEM SERPL-MCNC: 30.3 NG/ML

## 2018-05-03 DIAGNOSIS — F51.01 PRIMARY INSOMNIA: ICD-10-CM

## 2018-05-03 RX ORDER — ZOLPIDEM TARTRATE 10 MG/1
10 TABLET ORAL NIGHTLY PRN
Qty: 90 TABLET | Refills: 0 | Status: SHIPPED | OUTPATIENT
Start: 2018-05-03 | End: 2018-08-02 | Stop reason: SDUPTHER

## 2018-07-25 DIAGNOSIS — Z00.00 ENCOUNTER FOR ROUTINE ADULT MEDICAL EXAMINATION: ICD-10-CM

## 2018-07-25 DIAGNOSIS — E55.9 VITAMIN D DEFICIENCY: Primary | ICD-10-CM

## 2018-07-25 LAB
25(OH)D3+25(OH)D2 SERPL-MCNC: 21.8 NG/ML
ALBUMIN SERPL-MCNC: 4.5 G/DL (ref 3.5–5.2)
ALBUMIN/GLOB SERPL: 1.8 G/DL
ALP SERPL-CCNC: 82 U/L (ref 40–129)
ALT SERPL-CCNC: 25 U/L (ref 5–33)
AST SERPL-CCNC: 19 U/L (ref 5–32)
BASOPHILS # BLD AUTO: 0.02 10*3/MM3 (ref 0–0.2)
BASOPHILS NFR BLD AUTO: 0.4 % (ref 0–2)
BILIRUB SERPL-MCNC: 0.4 MG/DL (ref 0.2–1.2)
BUN SERPL-MCNC: 10 MG/DL (ref 6–20)
BUN/CREAT SERPL: 11.8 (ref 7–25)
CALCIUM SERPL-MCNC: 9.6 MG/DL (ref 8.6–10.5)
CHLORIDE SERPL-SCNC: 100 MMOL/L (ref 98–107)
CHOLEST SERPL-MCNC: 273 MG/DL (ref 0–200)
CO2 SERPL-SCNC: 30 MMOL/L (ref 22–29)
CREAT SERPL-MCNC: 0.85 MG/DL (ref 0.57–1)
DIFFERENTIAL COMMENT: NORMAL
EOSINOPHIL # BLD AUTO: 0.04 10*3/MM3 (ref 0.1–0.3)
EOSINOPHIL NFR BLD AUTO: 0.8 % (ref 0–4)
ERYTHROCYTE [DISTWIDTH] IN BLOOD BY AUTOMATED COUNT: 12.4 % (ref 11.5–14.5)
GLOBULIN SER CALC-MCNC: 2.5 GM/DL
GLUCOSE SERPL-MCNC: 119 MG/DL (ref 65–99)
HCT VFR BLD AUTO: 37.6 % (ref 37–47)
HDLC SERPL-MCNC: 51 MG/DL (ref 40–60)
HGB BLD-MCNC: 12.9 G/DL (ref 12–16)
IMM GRANULOCYTES # BLD: 0.01 10*3/MM3 (ref 0–0.03)
IMM GRANULOCYTES NFR BLD: 0.2 % (ref 0–0.5)
LDLC SERPL CALC-MCNC: 164 MG/DL (ref 0–100)
LDLC/HDLC SERPL: 3.21 {RATIO}
LYMPHOCYTES # BLD AUTO: 2.38 10*3/MM3 (ref 0.6–4.8)
LYMPHOCYTES NFR BLD AUTO: 47.8 % (ref 20–45)
MCH RBC QN AUTO: 36.2 PG (ref 27–31)
MCHC RBC AUTO-ENTMCNC: 34.3 G/DL (ref 31–37)
MCV RBC AUTO: 105.6 FL (ref 81–99)
MONOCYTES # BLD AUTO: 0.37 10*3/MM3 (ref 0–1)
MONOCYTES NFR BLD AUTO: 7.4 % (ref 3–8)
NEUTROPHILS # BLD AUTO: 2.16 10*3/MM3 (ref 1.5–8.3)
NEUTROPHILS NFR BLD AUTO: 43.4 % (ref 45–70)
NRBC BLD AUTO-RTO: 0 /100 WBC (ref 0–0)
PLATELET # BLD AUTO: 180 10*3/MM3 (ref 140–500)
PLATELET BLD QL SMEAR: NORMAL
POTASSIUM SERPL-SCNC: 4.4 MMOL/L (ref 3.5–5.2)
PROT SERPL-MCNC: 7 G/DL (ref 6–8.5)
RBC # BLD AUTO: 3.56 10*6/MM3 (ref 4.2–5.4)
RBC MORPH BLD: NORMAL
SODIUM SERPL-SCNC: 141 MMOL/L (ref 136–145)
TRIGL SERPL-MCNC: 292 MG/DL (ref 0–150)
VLDLC SERPL CALC-MCNC: 58.4 MG/DL (ref 7–27)
WBC # BLD AUTO: 4.98 10*3/MM3 (ref 4.8–10.8)

## 2018-08-02 ENCOUNTER — OFFICE VISIT (OUTPATIENT)
Dept: FAMILY MEDICINE CLINIC | Facility: CLINIC | Age: 53
End: 2018-08-02

## 2018-08-02 VITALS
BODY MASS INDEX: 28.85 KG/M2 | WEIGHT: 169 LBS | DIASTOLIC BLOOD PRESSURE: 84 MMHG | TEMPERATURE: 97.9 F | OXYGEN SATURATION: 96 % | SYSTOLIC BLOOD PRESSURE: 120 MMHG | RESPIRATION RATE: 16 BRPM | HEART RATE: 83 BPM | HEIGHT: 64 IN

## 2018-08-02 DIAGNOSIS — F51.01 PRIMARY INSOMNIA: ICD-10-CM

## 2018-08-02 DIAGNOSIS — Z00.00 ENCOUNTER FOR ANNUAL PHYSICAL EXAM: Primary | ICD-10-CM

## 2018-08-02 DIAGNOSIS — R73.9 HYPERGLYCEMIA: ICD-10-CM

## 2018-08-02 DIAGNOSIS — F41.9 ANXIETY AND DEPRESSION: ICD-10-CM

## 2018-08-02 DIAGNOSIS — F32.A ANXIETY AND DEPRESSION: ICD-10-CM

## 2018-08-02 DIAGNOSIS — E78.2 MIXED HYPERLIPIDEMIA: ICD-10-CM

## 2018-08-02 PROCEDURE — 99396 PREV VISIT EST AGE 40-64: CPT | Performed by: PHYSICIAN ASSISTANT

## 2018-08-02 NOTE — PROGRESS NOTES
"Subjective   Lynn MATTHEWS Speaker is a 53 y.o. female.   Chief Complaint   Patient presents with   • Annual Exam       History of Present Illness     Lynn is a 53-year-old female who presents for annual physical examination.  She has lost 6 pounds since March 28, 2018. She takes Ambien nightly for insomnia.  States the medication helps her get to sleep and stay asleep.  Doing well with the Celexa and Wellbutrin.  Denied any Suicidal or homicidal thoughts.  Bowel movements are daily without dark black tarry stools.  Appetite has been healthy.  Denied any chest pain,shortness of air,wheezing,URI symptoms,abdominal pain,dysuria or swelling of ankles.  She sees  for her mammogram.     The following portions of the patient's history were reviewed and updated as appropriate: allergies, current medications, past family history, past medical history, past social history and past surgical history.    Review of Systems   Constitutional: Negative.    HENT: Negative.    Eyes: Negative.    Respiratory: Negative.  Negative for cough, shortness of breath and wheezing.    Cardiovascular: Negative.  Negative for chest pain, palpitations and leg swelling.   Gastrointestinal: Negative.    Endocrine: Negative.    Genitourinary: Negative.    Musculoskeletal: Negative.    Skin: Negative.    Allergic/Immunologic: Negative.    Neurological: Negative.    Hematological: Negative.    Psychiatric/Behavioral: Positive for sleep disturbance. Negative for suicidal ideas.   All other systems reviewed and are negative.    Vitals:    08/02/18 0956   BP: 120/84   BP Location: Left arm   Patient Position: Sitting   Cuff Size: Adult   Pulse: 83   Resp: 16   Temp: 97.9 °F (36.6 °C)   TempSrc: Oral   SpO2: 96%   Weight: 76.7 kg (169 lb)   Height: 162.6 cm (64\")       Wt Readings from Last 3 Encounters:   08/02/18 76.7 kg (169 lb)   03/28/18 79.4 kg (175 lb)   09/05/17 78.9 kg (174 lb)       BP Readings from Last 3 Encounters:   08/02/18 120/84 "   03/28/18 120/68   09/05/17 116/70     Body mass index is 29.01 kg/m².    Allergies   Allergen Reactions   • No Known Drug Allergy        Objective   Physical Exam   Constitutional: She is oriented to person, place, and time. Vital signs are normal. She appears well-developed and well-nourished.   Overweight female   HENT:   Head: Normocephalic and atraumatic.   Right Ear: Hearing, tympanic membrane, external ear and ear canal normal.   Left Ear: Hearing, tympanic membrane, external ear and ear canal normal.   Nose: Nose normal. Right sinus exhibits no maxillary sinus tenderness and no frontal sinus tenderness. Left sinus exhibits no maxillary sinus tenderness and no frontal sinus tenderness.   Mouth/Throat: Uvula is midline, oropharynx is clear and moist and mucous membranes are normal.   Eyes: Pupils are equal, round, and reactive to light. Conjunctivae, EOM and lids are normal.   Neck: Trachea normal and phonation normal. Neck supple. Carotid bruit is not present. No edema present. No thyromegaly present.   Cardiovascular: Normal rate, regular rhythm, S1 normal, S2 normal, normal heart sounds and normal pulses.    Pulmonary/Chest: Effort normal and breath sounds normal.   Abdominal: Soft. Normal appearance, normal aorta and bowel sounds are normal. There is no hepatomegaly. There is no tenderness.   Lymphadenopathy:     She has no cervical adenopathy.   Neurological: She is alert and oriented to person, place, and time.   Reflex Scores:       Patellar reflexes are 2+ on the right side and 2+ on the left side.  Skin: Skin is warm, dry and intact. Capillary refill takes less than 2 seconds.   Psychiatric: She has a normal mood and affect. Her speech is normal and behavior is normal. Judgment and thought content normal. Cognition and memory are normal.       Assessment/Plan   Lynn was seen today for annual exam.    Diagnoses and all orders for this visit:    Encounter for annual physical exam    Anxiety and  depression    Primary insomnia  -     zolpidem (AMBIEN) 10 MG tablet; Take 1 tablet by mouth At Night As Needed for Sleep.    Hyperglycemia  -     Hemoglobin A1c; Future    Mixed hyperlipidemia  -     Comprehensive Metabolic Panel; Future  -     Lipid Panel With LDL / HDL Ratio; Future      1.  Annual physical examination with stable anxiety and depression: I have reviewed her lab work with her at office visit today.  I have encouraged ROM to start increasing her physical activity and to eat healthy.  She will continue her current anxiety/depression medications at home.  Plan to follow-up in 6 months.  2.  Chronic and uncontrolled hyperlipidemia and hyperglycemia: I have reviewed lab work with her at office visit today.  Her cholesterol, triglycerides and fasting blood sugar were elevated.  I suspect this is a dietary in nature.  Have encouraged her to decrease carbohydrates and fatty food in diet.  She will also increase physical activity.  Plan to repeat her fasting blood work in 3 months .  CMP, lipid profile and A1c.  She'll be notified of test results and any medication changes in 3 months.  3.  Chronic and stable insomnia: Doing well with her Ambien medication.  Dr. Hartmann has approved a refill.  See below for Jorge information.  She will schedule follow-up appointment in 6 months.      As part of this patient's treatment plan I am prescribing controlled substances.  The patient has been made aware of appropriate use of such medications, including potential risk of somnolence. Limited ability to drive and/or work safely, and potential for dependence or overdose.  It has also been made clear that these medications are for use by this patient only, without concomitant use of alcohol or other substances unless prescribed.  JORGE report has been reviewed and scanned into the patient's chart.  Jorge number is [id number] dated July 31, 2018        Orders Only on 07/25/2018   Component Date Value Ref Range Status    • WBC 07/25/2018 4.98  4.80 - 10.80 10*3/mm3 Final   • RBC 07/25/2018 3.56* 4.20 - 5.40 10*6/mm3 Final   • Hemoglobin 07/25/2018 12.9  12.0 - 16.0 g/dL Final   • Hematocrit 07/25/2018 37.6  37.0 - 47.0 % Final   • MCV 07/25/2018 105.6* 81.0 - 99.0 fL Final   • MCH 07/25/2018 36.2* 27.0 - 31.0 pg Final   • MCHC 07/25/2018 34.3  31.0 - 37.0 g/dL Final   • RDW 07/25/2018 12.4  11.5 - 14.5 % Final   • Platelets 07/25/2018 180  140 - 500 10*3/mm3 Final   • Neutrophil Rel % 07/25/2018 43.4* 45.0 - 70.0 % Final   • Lymphocyte Rel % 07/25/2018 47.8* 20.0 - 45.0 % Final   • Monocyte Rel % 07/25/2018 7.4  3.0 - 8.0 % Final   • Eosinophil Rel % 07/25/2018 0.8  0.0 - 4.0 % Final   • Basophil Rel % 07/25/2018 0.4  0.0 - 2.0 % Final   • Neutrophils Absolute 07/25/2018 2.16  1.50 - 8.30 10*3/mm3 Final   • Lymphocytes Absolute 07/25/2018 2.38  0.60 - 4.80 10*3/mm3 Final   • Monocytes Absolute 07/25/2018 0.37  0.00 - 1.00 10*3/mm3 Final   • Eosinophils Absolute 07/25/2018 0.04* 0.10 - 0.30 10*3/mm3 Final   • Basophils Absolute 07/25/2018 0.02  0.00 - 0.20 10*3/mm3 Final   • Immature Granulocyte Rel % 07/25/2018 0.2  0.0 - 0.5 % Final   • Immature Grans Absolute 07/25/2018 0.01  0.00 - 0.03 10*3/mm3 Final   • nRBC 07/25/2018 0.0  0.0 - 0.0 /100 WBC Final   • Glucose 07/25/2018 119* 65 - 99 mg/dL Final   • BUN 07/25/2018 10  6 - 20 mg/dL Final   • Creatinine 07/25/2018 0.85  0.57 - 1.00 mg/dL Final   • eGFR Non  Am 07/25/2018 71  >60 mL/min/1.73 Final   • eGFR African Am 07/25/2018 85  >60 mL/min/1.73 Final   • BUN/Creatinine Ratio 07/25/2018 11.8  7.0 - 25.0 Final   • Sodium 07/25/2018 141  136 - 145 mmol/L Final   • Potassium 07/25/2018 4.4  3.5 - 5.2 mmol/L Final   • Chloride 07/25/2018 100  98 - 107 mmol/L Final   • Total CO2 07/25/2018 30.0* 22.0 - 29.0 mmol/L Final   • Calcium 07/25/2018 9.6  8.6 - 10.5 mg/dL Final   • Total Protein 07/25/2018 7.0  6.0 - 8.5 g/dL Final   • Albumin 07/25/2018 4.50  3.50 - 5.20 g/dL Final    • Globulin 07/25/2018 2.5  gm/dL Final   • A/G Ratio 07/25/2018 1.8  g/dL Final   • Total Bilirubin 07/25/2018 0.4  0.2 - 1.2 mg/dL Final   • Alkaline Phosphatase 07/25/2018 82  40 - 129 U/L Final   • AST (SGOT) 07/25/2018 19  5 - 32 U/L Final   • ALT (SGPT) 07/25/2018 25  5 - 33 U/L Final   • Total Cholesterol 07/25/2018 273* 0 - 200 mg/dL Final   • Triglycerides 07/25/2018 292* 0 - 150 mg/dL Final   • HDL Cholesterol 07/25/2018 51  40 - 60 mg/dL Final   • VLDL Cholesterol 07/25/2018 58.4* 7 - 27 mg/dL Final   • LDL Cholesterol  07/25/2018 164* 0 - 100 mg/dL Final   • LDL/HDL Ratio 07/25/2018 3.21   Final   • 25 Hydroxy, Vitamin D 07/25/2018 21.8  ng/mL Final    Comment: Reference Range for Total Vitamin D 25(OH)  Deficiency    <20.0 ng/mL  Insufficiency 21-29 ng/mL  Sufficiency   30-74 ng/mL     • Differential Comment 07/25/2018 Comment   Final    WBC MORPHOLOGY               Normal                      Normal     N   • Comment 07/25/2018 Comment   Final    MACROCYTES                   Slight/1+                   None Seen  N   • Plt Comment 07/25/2018 Comment   Final    PLATELET MORPHOLOGY          Normal                      Normal     N

## 2018-08-03 RX ORDER — ZOLPIDEM TARTRATE 10 MG/1
10 TABLET ORAL NIGHTLY PRN
Qty: 90 TABLET | Refills: 1
Start: 2018-08-03 | End: 2019-04-01

## 2018-08-03 NOTE — PROGRESS NOTES
I have reviewed the notes, assessments, and/or procedures performed by Anabella BALBUENA, I concur with her/his documentation of Lynn Gomez.

## 2018-08-16 LAB
HBA1C MFR BLD: 5.4 % (ref 4.8–5.6)
WRITTEN AUTHORIZATION: NORMAL

## 2018-08-18 DIAGNOSIS — F41.9 ANXIETY AND DEPRESSION: ICD-10-CM

## 2018-08-18 DIAGNOSIS — F32.A ANXIETY AND DEPRESSION: ICD-10-CM

## 2018-08-20 RX ORDER — CITALOPRAM 40 MG/1
TABLET ORAL
Qty: 90 TABLET | Refills: 2 | Status: SHIPPED | OUTPATIENT
Start: 2018-08-20 | End: 2019-04-01 | Stop reason: SDUPTHER

## 2018-08-21 ENCOUNTER — TRANSCRIBE ORDERS (OUTPATIENT)
Dept: ADMINISTRATIVE | Facility: HOSPITAL | Age: 53
End: 2018-08-21

## 2018-08-21 DIAGNOSIS — Z12.31 VISIT FOR SCREENING MAMMOGRAM: Primary | ICD-10-CM

## 2018-08-28 ENCOUNTER — HOSPITAL ENCOUNTER (OUTPATIENT)
Dept: MAMMOGRAPHY | Facility: HOSPITAL | Age: 53
Discharge: HOME | End: 2018-08-28
Attending: OBSTETRICS & GYNECOLOGY | Admitting: OBSTETRICS & GYNECOLOGY

## 2018-08-28 DIAGNOSIS — Z12.31 VISIT FOR SCREENING MAMMOGRAM: ICD-10-CM

## 2018-08-28 PROCEDURE — 77063 BREAST TOMOSYNTHESIS BI: CPT

## 2018-08-28 PROCEDURE — 77067 SCR MAMMO BI INCL CAD: CPT

## 2018-11-02 ENCOUNTER — RESULTS ENCOUNTER (OUTPATIENT)
Dept: FAMILY MEDICINE CLINIC | Facility: CLINIC | Age: 53
End: 2018-11-02

## 2018-11-02 DIAGNOSIS — E78.2 MIXED HYPERLIPIDEMIA: ICD-10-CM

## 2018-11-02 DIAGNOSIS — R73.9 HYPERGLYCEMIA: ICD-10-CM

## 2018-11-16 LAB
ALBUMIN SERPL-MCNC: 3.4 G/DL (ref 3.5–5.2)
ALBUMIN/GLOB SERPL: 1.5 G/DL
ALP SERPL-CCNC: 65 U/L (ref 40–129)
ALT SERPL-CCNC: 21 U/L (ref 5–33)
AST SERPL-CCNC: 17 U/L (ref 5–32)
BILIRUB SERPL-MCNC: 0.2 MG/DL (ref 0.2–1.2)
BUN SERPL-MCNC: 8 MG/DL (ref 6–20)
BUN/CREAT SERPL: 9.6 (ref 7–25)
CALCIUM SERPL-MCNC: 8 MG/DL (ref 8.6–10.5)
CHLORIDE SERPL-SCNC: 106 MMOL/L (ref 98–107)
CHOLEST SERPL-MCNC: 182 MG/DL (ref 0–200)
CO2 SERPL-SCNC: 21.6 MMOL/L (ref 22–29)
CREAT SERPL-MCNC: 0.83 MG/DL (ref 0.57–1)
GLOBULIN SER CALC-MCNC: 2.2 GM/DL
GLUCOSE SERPL-MCNC: 76 MG/DL (ref 65–99)
HBA1C MFR BLD: 5 % (ref 4.8–5.6)
HDLC SERPL-MCNC: 44 MG/DL (ref 40–60)
LDLC SERPL CALC-MCNC: 114 MG/DL (ref 0–100)
LDLC/HDLC SERPL: 2.59 {RATIO}
POTASSIUM SERPL-SCNC: 4.6 MMOL/L (ref 3.5–5.2)
PROT SERPL-MCNC: 5.6 G/DL (ref 6–8.5)
SODIUM SERPL-SCNC: 143 MMOL/L (ref 136–145)
TRIGL SERPL-MCNC: 121 MG/DL (ref 0–150)
VLDLC SERPL CALC-MCNC: 24.2 MG/DL (ref 7–27)

## 2018-12-17 RX ORDER — BUPROPION HYDROCHLORIDE 150 MG/1
TABLET ORAL
Qty: 90 TABLET | Refills: 2 | Status: SHIPPED | OUTPATIENT
Start: 2018-12-17 | End: 2019-04-01

## 2019-03-31 DIAGNOSIS — F51.01 PRIMARY INSOMNIA: ICD-10-CM

## 2019-04-01 ENCOUNTER — OFFICE VISIT (OUTPATIENT)
Dept: FAMILY MEDICINE CLINIC | Facility: CLINIC | Age: 54
End: 2019-04-01

## 2019-04-01 VITALS
HEART RATE: 89 BPM | TEMPERATURE: 98.3 F | WEIGHT: 173 LBS | OXYGEN SATURATION: 94 % | RESPIRATION RATE: 16 BRPM | HEIGHT: 64 IN | DIASTOLIC BLOOD PRESSURE: 82 MMHG | SYSTOLIC BLOOD PRESSURE: 120 MMHG | BODY MASS INDEX: 29.53 KG/M2

## 2019-04-01 DIAGNOSIS — F51.01 PRIMARY INSOMNIA: Primary | ICD-10-CM

## 2019-04-01 DIAGNOSIS — F41.9 ANXIETY AND DEPRESSION: ICD-10-CM

## 2019-04-01 DIAGNOSIS — Z79.899 HIGH RISK MEDICATION USE: ICD-10-CM

## 2019-04-01 DIAGNOSIS — F32.A ANXIETY AND DEPRESSION: ICD-10-CM

## 2019-04-01 PROCEDURE — 99213 OFFICE O/P EST LOW 20 MIN: CPT | Performed by: PHYSICIAN ASSISTANT

## 2019-04-01 RX ORDER — BUPROPION HYDROCHLORIDE 150 MG/1
150 TABLET ORAL DAILY
Qty: 90 TABLET | Refills: 3 | Status: SHIPPED | OUTPATIENT
Start: 2019-04-01 | End: 2019-10-18 | Stop reason: SDUPTHER

## 2019-04-01 RX ORDER — ZOLPIDEM TARTRATE 10 MG/1
TABLET ORAL
Qty: 90 TABLET | OUTPATIENT
Start: 2019-04-01

## 2019-04-01 RX ORDER — CITALOPRAM 40 MG/1
40 TABLET ORAL DAILY
Qty: 90 TABLET | Refills: 3 | Status: SHIPPED | OUTPATIENT
Start: 2019-04-01 | End: 2019-05-27 | Stop reason: SDUPTHER

## 2019-04-01 NOTE — PROGRESS NOTES
"Subjective   Lynn R Speaker is a 53 y.o. female presents for   Chief Complaint   Patient presents with   • Insomnia     maint   • Depression   • Anxiety       History of Present Illness     Lynn is a 53-year-old female who presents for insomnia ,depression dn anxiety management. She is doing well with the Ambien,Celexa and Wellbutrin. Denied any Suicidal or homicidal thoughts.  Appetite has been normal.  Sleep  has been good with the Ambien.      The following portions of the patient's history were reviewed and updated as appropriate: allergies, current medications, past family history, past medical history, past social history, past surgical history and problem list.    Review of Systems   Constitutional: Negative.    HENT: Negative.    Eyes: Negative.    Respiratory: Negative.  Negative for cough, shortness of breath and wheezing.    Cardiovascular: Negative.  Negative for chest pain, palpitations and leg swelling.   Gastrointestinal: Negative.    Endocrine: Negative.    Genitourinary: Negative.    Musculoskeletal: Negative.    Skin: Negative.    Allergic/Immunologic: Negative.    Neurological: Negative.    Hematological: Negative.    Psychiatric/Behavioral: Negative.  Negative for self-injury, sleep disturbance and suicidal ideas. The patient is not nervous/anxious.    All other systems reviewed and are negative.        Vitals:    04/01/19 1544   BP: 120/82   Pulse: 89   Resp: 16   Temp: 98.3 °F (36.8 °C)   SpO2: 94%   Weight: 78.5 kg (173 lb)   Height: 162.6 cm (64\")     Wt Readings from Last 3 Encounters:   04/01/19 78.5 kg (173 lb)   08/02/18 76.7 kg (169 lb)   03/28/18 79.4 kg (175 lb)     BP Readings from Last 3 Encounters:   04/01/19 120/82   08/02/18 120/84   03/28/18 120/68     Social History     Socioeconomic History   • Marital status: Single     Spouse name: Not on file   • Number of children: Not on file   • Years of education: Not on file   • Highest education level: Not on file   Tobacco Use   • " Smoking status: Never Smoker   • Smokeless tobacco: Never Used   Substance and Sexual Activity   • Alcohol use: Yes     Comment: occasional   • Drug use: No   • Sexual activity: No       Allergies   Allergen Reactions   • No Known Drug Allergy        Body mass index is 29.7 kg/m².    Objective   Physical Exam   Constitutional: She is oriented to person, place, and time. Vital signs are normal. She appears well-developed and well-nourished.   Neck: Trachea normal and phonation normal. Neck supple. No edema present.   Cardiovascular: Normal rate, regular rhythm, S1 normal, S2 normal, normal heart sounds and normal pulses.   Pulmonary/Chest: Effort normal and breath sounds normal.   Abdominal: Soft. Normal appearance, normal aorta and bowel sounds are normal. There is no hepatomegaly. There is no tenderness.   Neurological: She is alert and oriented to person, place, and time.   Skin: Skin is warm, dry and intact. Capillary refill takes less than 2 seconds.   Psychiatric: She has a normal mood and affect. Her speech is normal and behavior is normal. Judgment and thought content normal. Cognition and memory are normal.       Assessment/Plan   Lynn was seen today for insomnia, depression and anxiety.    Diagnoses and all orders for this visit:    Primary insomnia  -     Compliance Drug Analysis, Ur - Urine, Clean Catch  -     zolpidem (AMBIEN) 10 MG tablet; Take 1 tablet by mouth At Night As Needed for Sleep.    Anxiety and depression  -     buPROPion XL (WELLBUTRIN XL) 150 MG 24 hr tablet; Take 1 tablet by mouth Daily.  -     citalopram (CeleXA) 40 MG tablet; Take 1 tablet by mouth Daily.    High risk medication use  -     Compliance Drug Analysis, Ur - Urine, Clean Catch  -     zolpidem (AMBIEN) 10 MG tablet; Take 1 tablet by mouth At Night As Needed for Sleep.    1.  Chronic and stable primary insomnia: Doing well with her Ambien medication.  Lynn has signed the appropriate agreement and consent forms for the  medication.  She will have a urine drug screen collected office visit today for verification of medication usage.  She states she does take nightly.  Dr. Tim Bah has approved a Ambien refill.  See below for Jorge information.  She will return to office in 6 months for reevaluation.  She will be notified of test results when completed.  2.  Chronic and stable anxiety with depression: Doing well with her Wellbutrin and citalopram medication.  I have sent refills to her local pharmacy.  Plan to follow-up in 6 months.        As part of this patient's treatment plan I am prescribing controlled substances.  The patient has been made aware of appropriate use of such medications, including potential risk of somnolence. Limited ability to drive and/or work safely, and potential for dependence or overdose.  It has also been made clear that these medications are for use by this patient only, without concomitant use of alcohol or other substances unless prescribed.  JORGE report has been reviewed and scanned into the patient's chart.  Jorge number is [id number] dated March 30, 2019.      [facility]  [address] [place] [id number]  Dept: [phone]  Dept Fax: [phone]  Loc: [phone]  Loc Fax: [phone]

## 2019-04-01 NOTE — TELEPHONE ENCOUNTER
She needs an appointment before refill.  She needs to be seen every 6 months for her Ambien medication.

## 2019-04-01 NOTE — TELEPHONE ENCOUNTER
Pt in need of Ambien refill     Last Refill: 08/03/2018  Last UDS: never  Last Juan Daniel: 04/01/2019  Last OV: 08/02/2018

## 2019-04-02 RX ORDER — ZOLPIDEM TARTRATE 10 MG/1
10 TABLET ORAL NIGHTLY PRN
Qty: 30 TABLET | Refills: 5
Start: 2019-04-02 | End: 2019-10-07 | Stop reason: SDUPTHER

## 2019-04-04 LAB — DRUGS UR: NORMAL

## 2019-05-25 DIAGNOSIS — F32.A ANXIETY AND DEPRESSION: ICD-10-CM

## 2019-05-25 DIAGNOSIS — F41.9 ANXIETY AND DEPRESSION: ICD-10-CM

## 2019-05-27 RX ORDER — CITALOPRAM 40 MG/1
TABLET ORAL
Qty: 90 TABLET | Refills: 2 | Status: SHIPPED | OUTPATIENT
Start: 2019-05-27 | End: 2019-09-18

## 2019-07-26 ENCOUNTER — OFFICE VISIT (OUTPATIENT)
Dept: SPORTS MEDICINE | Facility: CLINIC | Age: 54
End: 2019-07-26

## 2019-07-26 VITALS
HEART RATE: 92 BPM | HEIGHT: 64 IN | OXYGEN SATURATION: 94 % | SYSTOLIC BLOOD PRESSURE: 126 MMHG | TEMPERATURE: 98.2 F | BODY MASS INDEX: 28.34 KG/M2 | DIASTOLIC BLOOD PRESSURE: 78 MMHG | WEIGHT: 166 LBS

## 2019-07-26 DIAGNOSIS — M54.12 CERVICAL RADICULITIS: ICD-10-CM

## 2019-07-26 DIAGNOSIS — M25.512 ACUTE PAIN OF LEFT SHOULDER: Primary | ICD-10-CM

## 2019-07-26 PROCEDURE — 99214 OFFICE O/P EST MOD 30 MIN: CPT | Performed by: FAMILY MEDICINE

## 2019-07-26 RX ORDER — PREDNISONE 10 MG/1
TABLET ORAL
Qty: 30 TABLET | Refills: 0 | Status: SHIPPED | OUTPATIENT
Start: 2019-07-26 | End: 2019-08-27

## 2019-07-26 NOTE — PROGRESS NOTES
"Lynn is a 54 y.o. year old female evaluation of a problem that is new to this examiner.    Chief Complaint   Patient presents with   • Shoulder Pain     left side; radiates to her arm, its a constant pain. can't lift       History of Present Illness   HPI   For the past 3 weeks patient has been having left shoulder region area pain.  No known trauma.  Pain is located over the superior medial scapula, also in the superior mid trapezius, lateral upper arm and forearm.  Denies any paresthesias.  Pain worse at night when lying down or laying on her left side.  Pain in the shoulder also worse with shoulder abduction.  Has not noted any weakness.    I have reviewed the patient's medical, family, and social history in detail and updated the computerized patient record.    Review of Systems   Constitutional: Negative for fever.   Musculoskeletal:        Per HPI   Skin: Negative for wound.   Neurological: Negative for numbness.   All other systems reviewed and are negative.      /78 (BP Location: Right arm, Patient Position: Sitting, Cuff Size: Adult)   Pulse 92   Temp 98.2 °F (36.8 °C) (Oral)   Ht 162.6 cm (64.02\")   Wt 75.3 kg (166 lb)   SpO2 94%   BMI 28.48 kg/m²      Physical Exam   Constitutional: She is oriented to person, place, and time. She appears well-developed and well-nourished.   HENT:   Head: Normocephalic and atraumatic.   Eyes: Conjunctivae and EOM are normal. Pupils are equal, round, and reactive to light.   Cardiovascular:   No peripheral edema   Pulmonary/Chest: Effort normal.   Musculoskeletal:   Cervical spine with full painless range of motion, negative Spurling.  Patient does have tenderness to palpation along the superior mid trapezius and also along the superior medial scapula.  Motor 5 out of 5 DTRs 2+ symmetric.    Left shoulder there is no tenderness to palpation along the subacromial space, no tenderness in the biceps tendon, no tenderness with palpation along the posterior " shoulder capsule.  Patient has a positive empty can, negative full can, no pain with external/internal rotation and has full range of motion.  Clay, negative Speed and Yergason's.   Neurological: She is alert and oriented to person, place, and time.   Skin: Skin is warm and dry.   Psychiatric: She has a normal mood and affect. Her behavior is normal.   Vitals reviewed.        Current Outpatient Medications:   •  buPROPion XL (WELLBUTRIN XL) 150 MG 24 hr tablet, Take 1 tablet by mouth Daily., Disp: 90 tablet, Rfl: 3  •  citalopram (CeleXA) 40 MG tablet, TAKE 1 TABLET DAILY, Disp: 90 tablet, Rfl: 2  •  zolpidem (AMBIEN) 10 MG tablet, Take 1 tablet by mouth At Night As Needed for Sleep., Disp: 30 tablet, Rfl: 5  •  predniSONE (DELTASONE) 10 MG tablet, 4 tabs qd x 3 d, then 3 tabs qd x 3 d, then 2 tabs qd x 3 d, then 1 tab qd  x 3 d, Disp: 30 tablet, Rfl: 0     Diagnoses and all orders for this visit:    Acute pain of left shoulder  -     predniSONE (DELTASONE) 10 MG tablet; 4 tabs qd x 3 d, then 3 tabs qd x 3 d, then 2 tabs qd x 3 d, then 1 tab qd  x 3 d    Cervical radiculitis  -     predniSONE (DELTASONE) 10 MG tablet; 4 tabs qd x 3 d, then 3 tabs qd x 3 d, then 2 tabs qd x 3 d, then 1 tab qd  x 3 d       Patient will be treated as a possible cervical radiculitis given that her pain is along the superior medial scapula, superior mid trap and radiating into the forearm even though her Spurling was negative and no paresthesias.  The prednisone should help if she is having any subacromial impingement as well.  Follow-up 2 weeks if no improvement.      EMR Dragon/Transcription disclaimer:    Much of this encounter note is an electronic transcription/translation of spoken language to printed text.  The electronic translation of spoken language may permit erroneous, or at times, nonsensical words or phrases to be inadvertently transcribed.  Although I have reviewed the note for such errors some may still exist.

## 2019-08-05 ENCOUNTER — TRANSCRIBE ORDERS (OUTPATIENT)
Dept: ADMINISTRATIVE | Facility: HOSPITAL | Age: 54
End: 2019-08-05

## 2019-08-05 DIAGNOSIS — Z12.31 VISIT FOR SCREENING MAMMOGRAM: Primary | ICD-10-CM

## 2019-08-06 ENCOUNTER — TELEPHONE (OUTPATIENT)
Dept: SPORTS MEDICINE | Facility: CLINIC | Age: 54
End: 2019-08-06

## 2019-08-06 DIAGNOSIS — M25.512 ACUTE PAIN OF LEFT SHOULDER: Primary | ICD-10-CM

## 2019-08-06 RX ORDER — DICLOFENAC POTASSIUM 50 MG/1
50 TABLET, FILM COATED ORAL 3 TIMES DAILY
Qty: 90 TABLET | Refills: 0 | Status: SHIPPED | OUTPATIENT
Start: 2019-08-06 | End: 2020-02-12

## 2019-08-27 ENCOUNTER — OFFICE VISIT (OUTPATIENT)
Dept: SPORTS MEDICINE | Facility: CLINIC | Age: 54
End: 2019-08-27

## 2019-08-27 VITALS
OXYGEN SATURATION: 95 % | WEIGHT: 168 LBS | HEART RATE: 82 BPM | SYSTOLIC BLOOD PRESSURE: 106 MMHG | HEIGHT: 64 IN | BODY MASS INDEX: 28.68 KG/M2 | DIASTOLIC BLOOD PRESSURE: 70 MMHG | TEMPERATURE: 97.8 F

## 2019-08-27 DIAGNOSIS — M75.42 SHOULDER IMPINGEMENT, LEFT: Primary | ICD-10-CM

## 2019-08-27 PROCEDURE — 99214 OFFICE O/P EST MOD 30 MIN: CPT | Performed by: FAMILY MEDICINE

## 2019-08-27 PROCEDURE — 73030 X-RAY EXAM OF SHOULDER: CPT | Performed by: FAMILY MEDICINE

## 2019-08-27 PROCEDURE — 20610 DRAIN/INJ JOINT/BURSA W/O US: CPT | Performed by: FAMILY MEDICINE

## 2019-08-27 RX ORDER — TRIAMCINOLONE ACETONIDE 40 MG/ML
80 INJECTION, SUSPENSION INTRA-ARTICULAR; INTRAMUSCULAR ONCE
Status: COMPLETED | OUTPATIENT
Start: 2019-08-27 | End: 2019-08-27

## 2019-08-27 RX ADMIN — TRIAMCINOLONE ACETONIDE 80 MG: 40 INJECTION, SUSPENSION INTRA-ARTICULAR; INTRAMUSCULAR at 11:40

## 2019-08-27 NOTE — PROGRESS NOTES
"Lynn is a 54 y.o. year old female follow up on a problem familiar to this examiner.     Chief Complaint   Patient presents with   • Shoulder Pain     left shoulder       History of Present Illness   HPI   Patient here to follow-up left shoulder pain.  Initially patient was also having left superior medial scapular pain as well as some radiating pain into the forearm.  Patient states that pain has resolved and now her pain is located mostly over the superior lateral slightly posterior shoulder itself.  Pain worse with overhead motions.  No paresthesias.  No neck pain.  No periscapular pain.    I have reviewed the patient's medical, family, and social history in detail and updated the computerized patient record.    Review of Systems   Constitutional: Negative for fever.   Musculoskeletal:        Per HPI   Skin: Negative for wound.   Neurological: Negative for numbness.   All other systems reviewed and are negative.      /70 (BP Location: Right arm, Patient Position: Sitting, Cuff Size: Adult)   Pulse 82   Temp 97.8 °F (36.6 °C) (Oral)   Ht 162.6 cm (64.02\")   Wt 76.2 kg (168 lb)   SpO2 95%   BMI 28.82 kg/m²      Physical Exam   Constitutional: She is oriented to person, place, and time. She appears well-developed and well-nourished.   HENT:   Head: Normocephalic and atraumatic.   Eyes: Conjunctivae and EOM are normal. Pupils are equal, round, and reactive to light.   Cardiovascular:   No peripheral edema   Pulmonary/Chest: Effort normal.   Musculoskeletal:   Cervical spine full painless range of motion.  Negative [place].    Left shoulder normal in general appearance, patient has some mild tenderness of the subacromial space and slightly posteriorly.  Positive Neer and Cheek.  Positive empty can.  Negative [place].  Negative sulcus.  Negative Speed and Yergenson's.   Neurological: She is alert and oriented to person, place, and time.   Skin: Skin is warm and dry.   Psychiatric: She has a normal mood " "and affect. Her behavior is normal.   Vitals reviewed.  Left Shoulder X-Ray  Indication: Pain  Views: AP Internal and External Rotation    Findings:  No fracture  No bony lesion  Normal soft tissues  Very mild AC arthritis changes.  Slightly high riding humeral head    No prior studies were available for comparison.    Discussed with the patient treatment options and she would like to proceed with subacromial steroid injection.  We have done this for her many years ago which was very helpful.    Shoulder Injection Procedure Note    Left shoulder subacromial bursa injection was discussed with the patient in detail, including indication, risks, benefits, and alternatives. Verbal consent was given for the procedure. Injection was performed by physician.  Injection site was identified by physical examination and cleaned with Betadine and alcohol swabs. Prior to needle insertion, ethyl chloride spray was used for surface anesthesia. Sterile technique was used.  A 25-gauge, 1.5\" needle was directed to the joint from a(n) lateral approach. Injectate was passed into the subacromial bursa without difficulty. The needle was removed and a simple bandage was applied. The procedure was tolerated well without difficulty.    Injection mixture:  2% lidocaine without epinephrine: 2 mL  40 mg/mL triamcinolone acetonide: 2 mL          Current Outpatient Medications:   •  buPROPion XL (WELLBUTRIN XL) 150 MG 24 hr tablet, Take 1 tablet by mouth Daily., Disp: 90 tablet, Rfl: 3  •  citalopram (CeleXA) 40 MG tablet, TAKE 1 TABLET DAILY, Disp: 90 tablet, Rfl: 2  •  diclofenac (CATAFLAM) 50 MG tablet, Take 1 tablet by mouth 3 (Three) Times a Day., Disp: 90 tablet, Rfl: 0  •  zolpidem (AMBIEN) 10 MG tablet, Take 1 tablet by mouth At Night As Needed for Sleep., Disp: 30 tablet, Rfl: 5  No current facility-administered medications for this visit.      Diagnoses and all orders for this visit:    Shoulder impingement, left  -     XR Shoulder 2+ " View Left  -     triamcinolone acetonide (KENALOG-40) injection 80 mg       Postinjection instructions given regarding ice and activity.  Also gave patient home exercise program.  Follow-up 4 to 6 weeks if no significant improvement.      EMR Dragon/Transcription disclaimer:    Much of this encounter note is an electronic transcription/translation of spoken language to printed text.  The electronic translation of spoken language may permit erroneous, or at times, nonsensical words or phrases to be inadvertently transcribed.  Although I have reviewed the note for such errors some may still exist.

## 2019-08-29 ENCOUNTER — HOSPITAL ENCOUNTER (OUTPATIENT)
Dept: MAMMOGRAPHY | Facility: HOSPITAL | Age: 54
Discharge: HOME | End: 2019-08-29
Admitting: OBSTETRICS & GYNECOLOGY

## 2019-08-29 DIAGNOSIS — Z12.31 VISIT FOR SCREENING MAMMOGRAM: ICD-10-CM

## 2019-08-29 PROCEDURE — 77063 BREAST TOMOSYNTHESIS BI: CPT

## 2019-08-29 PROCEDURE — 77067 SCR MAMMO BI INCL CAD: CPT

## 2019-09-18 ENCOUNTER — OFFICE VISIT (OUTPATIENT)
Dept: FAMILY MEDICINE CLINIC | Facility: CLINIC | Age: 54
End: 2019-09-18

## 2019-09-18 VITALS
RESPIRATION RATE: 14 BRPM | SYSTOLIC BLOOD PRESSURE: 120 MMHG | DIASTOLIC BLOOD PRESSURE: 80 MMHG | OXYGEN SATURATION: 98 % | BODY MASS INDEX: 27.66 KG/M2 | WEIGHT: 162 LBS | HEIGHT: 64 IN | HEART RATE: 111 BPM | TEMPERATURE: 97.9 F

## 2019-09-18 DIAGNOSIS — F33.1 MODERATE EPISODE OF RECURRENT MAJOR DEPRESSIVE DISORDER (HCC): Primary | ICD-10-CM

## 2019-09-18 DIAGNOSIS — F51.01 PRIMARY INSOMNIA: ICD-10-CM

## 2019-09-18 DIAGNOSIS — F41.9 ANXIETY AND DEPRESSION: ICD-10-CM

## 2019-09-18 DIAGNOSIS — F32.A ANXIETY AND DEPRESSION: ICD-10-CM

## 2019-09-18 PROCEDURE — 99213 OFFICE O/P EST LOW 20 MIN: CPT | Performed by: PHYSICIAN ASSISTANT

## 2019-09-18 RX ORDER — DESVENLAFAXINE SUCCINATE 50 MG/1
50 TABLET, EXTENDED RELEASE ORAL DAILY
Qty: 30 TABLET | Refills: 1 | Status: SHIPPED | OUTPATIENT
Start: 2019-09-18 | End: 2019-10-17 | Stop reason: SDUPTHER

## 2019-09-18 RX ORDER — BUPROPION HYDROCHLORIDE 150 MG/1
150 TABLET ORAL DAILY
Qty: 90 TABLET | Refills: 3 | Status: CANCELLED | OUTPATIENT
Start: 2019-09-18

## 2019-09-18 NOTE — PROGRESS NOTES
Subjective   Lynn Gomez is a 54 y.o. female presents for   Chief Complaint   Patient presents with   • Depression     management       History of Present Illness     Lynn is a 54-year-old female who presents for depression and insomnia management.  She has lost 4 pounds since July 26, 2019.She has been having increased depression issues over the past 3 months or so. Lynn states that she is feeling more sad tired.  Denied any suicidal or homicidal ideation/thoughts.  She has been taking the citalopram and Wellbutrin medications once a day as directed.  She tried to increase the Wellbutrin to 300 mg daily but did not see any improvement.  She is currently taking the 150 mg of Wellbutrin daily.  Lynn has been taking the Ambien nightly for insomnia.  No refills are needed at this time.  The Ambien does help her go to sleep and stay asleep.  She has been walking exercise which helps with her depression issues.  Appetite has been normal.  She has been taking OTC Calcium/Vit D chews once a day.  She refuses any updated immunizations.    The following portions of the patient's history were reviewed and updated as appropriate: allergies, current medications, past family history, past medical history, past social history, past surgical history and problem list.    Review of Systems   Constitutional: Negative.    HENT: Negative.    Eyes: Negative.    Respiratory: Negative.  Negative for cough, shortness of breath and wheezing.    Cardiovascular: Negative.  Negative for chest pain, palpitations and leg swelling.   Gastrointestinal: Negative.    Endocrine: Negative.    Genitourinary: Negative.    Musculoskeletal: Negative.    Skin: Negative.    Allergic/Immunologic: Negative.    Neurological: Negative.  Negative for dizziness, light-headedness and headaches.   Hematological: Negative.    Psychiatric/Behavioral: Negative.  Negative for agitation, behavioral problems, confusion, dysphoric mood, hallucinations, sleep  "disturbance and suicidal ideas. The patient is not nervous/anxious and is not hyperactive.         Increased depression symptoms with decreased desire to do things   All other systems reviewed and are negative.        Vitals:    09/18/19 1051 09/18/19 1126   BP: 140/88 120/80   BP Location:  Left arm   Patient Position:  Sitting   Cuff Size:  Adult   Pulse: 111    Resp: 14    Temp: 97.9 °F (36.6 °C)    SpO2: 98%    Weight: 73.5 kg (162 lb)    Height: 162.6 cm (64\")      Wt Readings from Last 3 Encounters:   09/18/19 73.5 kg (162 lb)   08/27/19 76.2 kg (168 lb)   07/26/19 75.3 kg (166 lb)     BP Readings from Last 3 Encounters:   09/18/19 120/80   08/27/19 106/70   07/26/19 126/78     Social History     Socioeconomic History   • Marital status: Single     Spouse name: Not on file   • Number of children: Not on file   • Years of education: Not on file   • Highest education level: Not on file   Tobacco Use   • Smoking status: Never Smoker   • Smokeless tobacco: Never Used   Substance and Sexual Activity   • Alcohol use: Yes     Comment: occasional   • Drug use: No   • Sexual activity: No       Allergies   Allergen Reactions   • No Known Drug Allergy        Body mass index is 27.81 kg/m².    Objective   Physical Exam   Constitutional: She is oriented to person, place, and time. Vital signs are normal. She appears well-developed and well-nourished.   Neck: Trachea normal and phonation normal. Neck supple. No edema present. No thyroid mass and no thyromegaly present.   Cardiovascular: Normal rate, regular rhythm, S1 normal, S2 normal, normal heart sounds and normal pulses.   No murmur heard.  Pulmonary/Chest: Effort normal and breath sounds normal.   Abdominal: Soft. Normal appearance, normal aorta and bowel sounds are normal. There is no hepatomegaly. There is no tenderness.   Neurological: She is alert and oriented to person, place, and time.   Skin: Skin is warm, dry and intact. Capillary refill takes less than 2 " seconds.   Psychiatric: Her speech is normal and behavior is normal. Judgment and thought content normal. Cognition and memory are normal. She exhibits a depressed mood.       Assessment/Plan   Lynn was seen today for depression.    Diagnoses and all orders for this visit:    Moderate episode of recurrent major depressive disorder (CMS/HCC)  -     desvenlafaxine (PRISTIQ) 50 MG 24 hr tablet; Take 1 tablet by mouth Daily.    Primary insomnia    1.  Chronic and uncontrolled major depression with current episodes: She has had breakthrough depression symptoms for the past 3 months or longer.  She had been on the citalopram and Wellbutrin medication for more than 8 years.  We will try Pristiq 50 mg daily in place of the citalopram medication.  I have asked her to start weaning off of the Wellbutrin medication as well.  She will wean off the Wellbutrin over the next 2-3 weeks.  Lynn will return to office in 1 month for reevaluation.  2.  Chronic and stable insomnia: Doing well with the Ambien medication.  She is due a refill in early October.  Will reevaluate at next office visit.    TRA Munoz [facility]  [address] [address]  Dept: [phone]  Dept Fax: [phone]  Loc: [phone]  Loc Fax: [phone]

## 2019-10-07 DIAGNOSIS — Z79.899 HIGH RISK MEDICATION USE: ICD-10-CM

## 2019-10-07 DIAGNOSIS — F51.01 PRIMARY INSOMNIA: ICD-10-CM

## 2019-10-07 RX ORDER — ZOLPIDEM TARTRATE 10 MG/1
TABLET ORAL
Qty: 30 TABLET | Refills: 5 | Status: SHIPPED | OUTPATIENT
Start: 2019-10-07 | End: 2020-01-27

## 2019-10-17 DIAGNOSIS — F33.1 MODERATE EPISODE OF RECURRENT MAJOR DEPRESSIVE DISORDER (HCC): ICD-10-CM

## 2019-10-17 RX ORDER — DESVENLAFAXINE SUCCINATE 50 MG/1
50 TABLET, EXTENDED RELEASE ORAL DAILY
Qty: 30 TABLET | Refills: 1 | Status: SHIPPED | OUTPATIENT
Start: 2019-10-17 | End: 2019-10-23 | Stop reason: SDUPTHER

## 2019-10-18 RX ORDER — BUPROPION HYDROCHLORIDE 150 MG/1
150 TABLET ORAL DAILY
Qty: 90 TABLET | Refills: 0 | Status: SHIPPED | OUTPATIENT
Start: 2019-10-18 | End: 2020-01-27

## 2019-10-23 DIAGNOSIS — F33.1 MODERATE EPISODE OF RECURRENT MAJOR DEPRESSIVE DISORDER (HCC): ICD-10-CM

## 2019-10-23 RX ORDER — DESVENLAFAXINE SUCCINATE 50 MG/1
50 TABLET, EXTENDED RELEASE ORAL DAILY
Qty: 30 TABLET | Refills: 1 | Status: SHIPPED | OUTPATIENT
Start: 2019-10-23 | End: 2019-11-07 | Stop reason: SDUPTHER

## 2019-10-31 ENCOUNTER — TELEPHONE (OUTPATIENT)
Dept: FAMILY MEDICINE CLINIC | Facility: CLINIC | Age: 54
End: 2019-10-31

## 2019-10-31 NOTE — TELEPHONE ENCOUNTER
Pharmacist left me a vm stating patient would like to fill her Ambien early. She told pharmacy she left her medicine when she was out of town. Last  was on 10/7. Please advise

## 2019-10-31 NOTE — TELEPHONE ENCOUNTER
Notify pharmacy that is okay to refill Ambien earlier if insurance will cover.   79 yo F DM, HTN, senile dementia, Mitral valve replacement, presenting for symptomatic hypoglycemia.

## 2019-11-07 DIAGNOSIS — F33.1 MODERATE EPISODE OF RECURRENT MAJOR DEPRESSIVE DISORDER (HCC): ICD-10-CM

## 2019-11-07 RX ORDER — DESVENLAFAXINE SUCCINATE 50 MG/1
50 TABLET, EXTENDED RELEASE ORAL DAILY
Qty: 90 TABLET | Refills: 1 | Status: SHIPPED | OUTPATIENT
Start: 2019-11-07 | End: 2020-05-27

## 2020-01-21 ENCOUNTER — OFFICE VISIT (OUTPATIENT)
Dept: SPORTS MEDICINE | Facility: CLINIC | Age: 55
End: 2020-01-21

## 2020-01-21 VITALS
TEMPERATURE: 98.4 F | SYSTOLIC BLOOD PRESSURE: 116 MMHG | HEART RATE: 94 BPM | HEIGHT: 64 IN | DIASTOLIC BLOOD PRESSURE: 82 MMHG | WEIGHT: 167 LBS | BODY MASS INDEX: 28.51 KG/M2 | OXYGEN SATURATION: 97 %

## 2020-01-21 DIAGNOSIS — G89.29 CHRONIC LEFT SHOULDER PAIN: Primary | ICD-10-CM

## 2020-01-21 DIAGNOSIS — M25.512 CHRONIC LEFT SHOULDER PAIN: Primary | ICD-10-CM

## 2020-01-21 PROCEDURE — 99214 OFFICE O/P EST MOD 30 MIN: CPT | Performed by: FAMILY MEDICINE

## 2020-01-21 NOTE — PROGRESS NOTES
"Lynn is a 54 y.o. year old female follow up on a problem familiar to this examiner.     Chief Complaint   Patient presents with   • Shoulder Pain     left shoudler       History of Present Illness   HPI   Since June of last year patient has been having pain left superior lateral shoulder.  No radicular symptoms.  No paresthesias.  No weakness.  Patient was initially treated with tapering prednisone in July which she states was very helpful however symptoms returned and in August she had a subacromial steroid injection which she states was helpful up until approximately 6 to 8 weeks ago.  No known trauma.  No pain during the day, pain is worse at night if she sleeps on the left side.  I have reviewed the patient's medical, family, and social history in detail and updated the computerized patient record.    Review of Systems   Constitutional: Negative for fever.   Musculoskeletal:        Per HPI   Skin: Negative for wound.   Neurological: Negative for numbness.   All other systems reviewed and are negative.      /82 (BP Location: Left arm, Patient Position: Sitting, Cuff Size: Adult)   Pulse 94   Temp 98.4 °F (36.9 °C) (Oral)   Ht 162.6 cm (64.02\")   Wt 75.8 kg (167 lb)   SpO2 97%   BMI 28.65 kg/m²      Physical Exam   Constitutional: She is oriented to person, place, and time. She appears well-developed and well-nourished.   HENT:   Head: Normocephalic and atraumatic.   Eyes: Pupils are equal, round, and reactive to light. Conjunctivae and EOM are normal.   Cardiovascular:   No peripheral edema   Pulmonary/Chest: Effort normal.   Musculoskeletal:   Cervical spine with full painless range of motion, negative [place].  Upper extremity motor 5 out of 5 DTRs 1+ symmetric.    Left shoulder with tenderness to palpation of the subacromial space.  Also positive Neer and Cheek.  Slightly positive empty can, also pain with resisted external rotation.  Equivocal drop arm test.  Negative [place].  Her range of " motion is good.   Neurological: She is alert and oriented to person, place, and time.   Skin: Skin is warm and dry.   Psychiatric: She has a normal mood and affect. Her behavior is normal.   Vitals reviewed.        Current Outpatient Medications:   •  buPROPion XL (WELLBUTRIN XL) 150 MG 24 hr tablet, Take 1 tablet by mouth Daily., Disp: 90 tablet, Rfl: 0  •  desvenlafaxine (PRISTIQ) 50 MG 24 hr tablet, Take 1 tablet by mouth Daily., Disp: 90 tablet, Rfl: 1  •  diclofenac (CATAFLAM) 50 MG tablet, Take 1 tablet by mouth 3 (Three) Times a Day., Disp: 90 tablet, Rfl: 0  •  zolpidem (AMBIEN) 10 MG tablet, TAKE ONE TABLET AT BEDTIME AS NEEDED FOR SLEEP, Disp: 30 tablet, Rfl: 5     Diagnoses and all orders for this visit:    Chronic left shoulder pain  -     FL Contrast Injection CT / MRI; Future  -     MRI Shoulder Left Arthrogram; Future       Will need to check MRI left shoulder rule out internal derangement including but not limited to labral tear, rotator cuff tear.      EMR Dragon/Transcription disclaimer:    Much of this encounter note is an electronic transcription/translation of spoken language to printed text.  The electronic translation of spoken language may permit erroneous, or at times, nonsensical words or phrases to be inadvertently transcribed.  Although I have reviewed the note for such errors some may still exist.

## 2020-01-27 ENCOUNTER — OFFICE VISIT (OUTPATIENT)
Dept: FAMILY MEDICINE CLINIC | Facility: CLINIC | Age: 55
End: 2020-01-27

## 2020-01-27 ENCOUNTER — TELEPHONE (OUTPATIENT)
Dept: SPORTS MEDICINE | Facility: CLINIC | Age: 55
End: 2020-01-27

## 2020-01-27 VITALS
RESPIRATION RATE: 16 BRPM | BODY MASS INDEX: 28.34 KG/M2 | WEIGHT: 166 LBS | SYSTOLIC BLOOD PRESSURE: 118 MMHG | DIASTOLIC BLOOD PRESSURE: 84 MMHG | HEART RATE: 103 BPM | TEMPERATURE: 98 F | OXYGEN SATURATION: 98 % | HEIGHT: 64 IN

## 2020-01-27 DIAGNOSIS — J01.00 ACUTE MAXILLARY SINUSITIS, RECURRENCE NOT SPECIFIED: Primary | ICD-10-CM

## 2020-01-27 DIAGNOSIS — F33.1 MODERATE EPISODE OF RECURRENT MAJOR DEPRESSIVE DISORDER (HCC): ICD-10-CM

## 2020-01-27 PROCEDURE — 99213 OFFICE O/P EST LOW 20 MIN: CPT | Performed by: PHYSICIAN ASSISTANT

## 2020-01-27 RX ORDER — BROMPHENIRAMINE MALEATE, PSEUDOEPHEDRINE HYDROCHLORIDE, AND DEXTROMETHORPHAN HYDROBROMIDE 2; 30; 10 MG/5ML; MG/5ML; MG/5ML
5 SYRUP ORAL 4 TIMES DAILY PRN
Qty: 118 ML | Refills: 0 | Status: SHIPPED | OUTPATIENT
Start: 2020-01-27 | End: 2020-02-12

## 2020-01-27 RX ORDER — CEFDINIR 300 MG/1
300 CAPSULE ORAL 2 TIMES DAILY
Qty: 20 CAPSULE | Refills: 0 | Status: SHIPPED | OUTPATIENT
Start: 2020-01-27 | End: 2020-02-12

## 2020-01-27 NOTE — PROGRESS NOTES
Subjective   Lynn MATTHEWS Speaker is a 54 y.o. female presents for   Chief Complaint   Patient presents with   • Nasal Congestion   • Cough     productive with green sputum       History of Present Illness     Lynn is a 54-year-old female who presents for nasal congestion, green productive cough,sinus pressure,stuffy nose to green rhinorrhea,and post nasal dip,ear pressure, for the past week or so.  Lynn has been taking OTC Mucinex and nasal rip.  Appetite has been normal.  Sleep has been normal.  Denied any headaches,wheezing,shortness of air,nausea,vomiting,diarrhea,headaches or sore throat.    Lynn has weaned off of the Ambien and Wellbutrin medications.  She is only taking the Pristiq medication.  States she is doing well with only the Pristiq.  Denied any suicidal homicidal ideation.  She is able to go to sleep and stay asleep.    The following portions of the patient's history were reviewed and updated as appropriate: allergies, current medications, past family history, past medical history, past social history, past surgical history and problem list.    Review of Systems   Constitutional: Negative.  Negative for chills, fatigue and fever.   HENT: Positive for congestion, ear pain, postnasal drip, rhinorrhea, sinus pressure and sinus pain. Negative for sore throat.    Eyes: Negative.    Respiratory: Positive for cough. Negative for shortness of breath and wheezing.    Cardiovascular: Negative.  Negative for chest pain, palpitations and leg swelling.   Gastrointestinal: Negative.  Negative for abdominal pain, constipation, diarrhea, nausea and vomiting.   Endocrine: Negative.    Genitourinary: Negative.    Musculoskeletal: Negative.    Skin: Negative.    Allergic/Immunologic: Negative.    Neurological: Negative.  Negative for dizziness, light-headedness and headaches.   Hematological: Negative.    Psychiatric/Behavioral: Negative.  Negative for sleep disturbance and suicidal ideas.   All other systems  "reviewed and are negative.        Vitals:    01/27/20 1137   BP: 118/84   Pulse: 103   Resp: 16   Temp: 98 °F (36.7 °C)   SpO2: 98%   Weight: 75.3 kg (166 lb)   Height: 162.6 cm (64\")     Wt Readings from Last 3 Encounters:   01/27/20 75.3 kg (166 lb)   01/21/20 75.8 kg (167 lb)   09/18/19 73.5 kg (162 lb)     BP Readings from Last 3 Encounters:   01/27/20 118/84   01/21/20 116/82   09/18/19 120/80     Social History     Socioeconomic History   • Marital status: Single     Spouse name: Not on file   • Number of children: Not on file   • Years of education: Not on file   • Highest education level: Not on file   Tobacco Use   • Smoking status: Never Smoker   • Smokeless tobacco: Never Used   Substance and Sexual Activity   • Alcohol use: Yes     Comment: occasional   • Drug use: No   • Sexual activity: Never       Allergies   Allergen Reactions   • No Known Drug Allergy        Body mass index is 28.49 kg/m².    Objective   Physical Exam   Constitutional: She is oriented to person, place, and time. Vital signs are normal. She appears well-developed and well-nourished.   HENT:   Head: Normocephalic and atraumatic.   Right Ear: Hearing, tympanic membrane, external ear and ear canal normal.   Left Ear: Hearing, external ear and ear canal normal. Tympanic membrane is bulging.   Nose: Right sinus exhibits maxillary sinus tenderness. Right sinus exhibits no frontal sinus tenderness. Left sinus exhibits maxillary sinus tenderness. Left sinus exhibits no frontal sinus tenderness.   Mouth/Throat: Uvula is midline and mucous membranes are normal.   Eyes: Pupils are equal, round, and reactive to light. Conjunctivae, EOM and lids are normal.   Fundoscopic exam:       The right eye shows no hemorrhage and no papilledema.        The left eye shows no hemorrhage and no papilledema.   Neck: Trachea normal and phonation normal. Neck supple. No tracheal tenderness present. No tracheal deviation and no edema present.   Cardiovascular: " Normal rate, regular rhythm, S1 normal, S2 normal, normal heart sounds and normal pulses.   No murmur heard.  Pulmonary/Chest: Effort normal and breath sounds normal.   Abdominal: Soft. Normal appearance, normal aorta and bowel sounds are normal. There is no hepatomegaly. There is no tenderness.   Lymphadenopathy:     She has no cervical adenopathy.   Neurological: She is alert and oriented to person, place, and time.   Skin: Skin is warm, dry and intact. Capillary refill takes less than 2 seconds.   Psychiatric: She has a normal mood and affect. Her speech is normal and behavior is normal. Judgment and thought content normal. Cognition and memory are normal.       Assessment/Plan   Lynn was seen today for nasal congestion and cough.    Diagnoses and all orders for this visit:    Acute maxillary sinusitis, recurrence not specified  -     cefdinir (OMNICEF) 300 MG capsule; Take 1 capsule by mouth 2 (Two) Times a Day.  -     brompheniramine-pseudoephedrine-DM 30-2-10 MG/5ML syrup; Take 5 mL by mouth 4 (Four) Times a Day As Needed for Congestion or Cough.    Moderate episode of recurrent major depressive disorder (CMS/HCC)    1.  Chronic and stable acute maxillary sinusitis.  I have prescribed Omnicef and Bromfed-DM prescriptions to pharmacy.  She will continue the nasal spray at home as directed.  She will return to office if symptoms do not improve.  2.  Chronic and stable moderate major depressive disorder: She has weaned off of the Wellbutrin medication.  She is doing well with the generic Pristiq medication.  No refills are needed at this time.  I have asked her to schedule follow-up examination for annual physical with fasting lab work in the next 3-4 months.  She voiced understanding.      [facility] Munoz [facility]  [address] [address]  Dept: [phone]  Dept Fax: [phone]  Loc: [phone]  Loc Fax:  [phone]

## 2020-01-30 ENCOUNTER — TELEPHONE (OUTPATIENT)
Dept: FAMILY MEDICINE CLINIC | Facility: CLINIC | Age: 55
End: 2020-01-30

## 2020-01-30 DIAGNOSIS — R68.89 FLU-LIKE SYMPTOMS: Primary | ICD-10-CM

## 2020-01-30 RX ORDER — OSELTAMIVIR PHOSPHATE 75 MG/1
75 CAPSULE ORAL 2 TIMES DAILY
Qty: 10 CAPSULE | Refills: 0 | Status: SHIPPED | OUTPATIENT
Start: 2020-01-30 | End: 2020-02-12

## 2020-01-30 NOTE — TELEPHONE ENCOUNTER
Pt calling, states she is feeling weak, and constantly cold. Thinks she has the flu.   Would like you to call something in for her for this.     Please joão.

## 2020-01-30 NOTE — TELEPHONE ENCOUNTER
Notify patient that I have sent Tamiflu to pharmacy.  Please increase fluid intake and take over-the-counter ibuprofen or Tylenol for fevers and body aches.

## 2020-01-30 NOTE — TELEPHONE ENCOUNTER
She was seen in office on January 27, 2020 and diagnosed with acute maxillary sinusitis.  She was placed on antibiotic and cough/decongestant oral medication at that time.  If she having any fevers?  Body aches?.

## 2020-02-11 ENCOUNTER — APPOINTMENT (OUTPATIENT)
Dept: MRI IMAGING | Facility: HOSPITAL | Age: 55
End: 2020-02-11

## 2020-02-11 ENCOUNTER — HOSPITAL ENCOUNTER (OUTPATIENT)
Dept: GENERAL RADIOLOGY | Facility: HOSPITAL | Age: 55
End: 2020-02-11

## 2020-02-12 ENCOUNTER — OFFICE VISIT (OUTPATIENT)
Dept: FAMILY MEDICINE CLINIC | Facility: CLINIC | Age: 55
End: 2020-02-12

## 2020-02-12 VITALS
HEART RATE: 100 BPM | SYSTOLIC BLOOD PRESSURE: 120 MMHG | TEMPERATURE: 97.5 F | WEIGHT: 159.25 LBS | OXYGEN SATURATION: 99 % | BODY MASS INDEX: 27.19 KG/M2 | HEIGHT: 64 IN | DIASTOLIC BLOOD PRESSURE: 80 MMHG

## 2020-02-12 DIAGNOSIS — A04.72 C. DIFFICILE COLITIS: ICD-10-CM

## 2020-02-12 DIAGNOSIS — R19.7 DIARRHEA OF PRESUMED INFECTIOUS ORIGIN: Primary | ICD-10-CM

## 2020-02-12 PROCEDURE — 99213 OFFICE O/P EST LOW 20 MIN: CPT | Performed by: FAMILY MEDICINE

## 2020-02-12 RX ORDER — ZOLPIDEM TARTRATE 10 MG/1
TABLET ORAL
COMMUNITY
Start: 2020-02-10 | End: 2020-11-04

## 2020-02-13 ENCOUNTER — TELEPHONE (OUTPATIENT)
Dept: FAMILY MEDICINE CLINIC | Facility: CLINIC | Age: 55
End: 2020-02-13

## 2020-02-13 LAB
ALBUMIN SERPL-MCNC: 4.2 G/DL (ref 3.8–4.9)
ALBUMIN/GLOB SERPL: 1.6 {RATIO} (ref 1.2–2.2)
ALP SERPL-CCNC: 73 IU/L (ref 39–117)
ALT SERPL-CCNC: 25 IU/L (ref 0–32)
AST SERPL-CCNC: 20 IU/L (ref 0–40)
BASOPHILS # BLD AUTO: 0 X10E3/UL (ref 0–0.2)
BASOPHILS NFR BLD AUTO: 1 %
BILIRUB SERPL-MCNC: 0.5 MG/DL (ref 0–1.2)
BUN SERPL-MCNC: 7 MG/DL (ref 6–24)
BUN/CREAT SERPL: 9 (ref 9–23)
CALCIUM SERPL-MCNC: 9.1 MG/DL (ref 8.7–10.2)
CHLORIDE SERPL-SCNC: 97 MMOL/L (ref 96–106)
CO2 SERPL-SCNC: 24 MMOL/L (ref 20–29)
CREAT SERPL-MCNC: 0.78 MG/DL (ref 0.57–1)
EOSINOPHIL # BLD AUTO: 0.2 X10E3/UL (ref 0–0.4)
EOSINOPHIL NFR BLD AUTO: 4 %
ERYTHROCYTE [DISTWIDTH] IN BLOOD BY AUTOMATED COUNT: 11.2 % (ref 11.7–15.4)
GLOBULIN SER CALC-MCNC: 2.6 G/DL (ref 1.5–4.5)
GLUCOSE SERPL-MCNC: 98 MG/DL (ref 65–99)
HCT VFR BLD AUTO: 38.5 % (ref 34–46.6)
HGB BLD-MCNC: 13.2 G/DL (ref 11.1–15.9)
IMM GRANULOCYTES # BLD AUTO: 0 X10E3/UL (ref 0–0.1)
IMM GRANULOCYTES NFR BLD AUTO: 0 %
LYMPHOCYTES # BLD AUTO: 2.3 X10E3/UL (ref 0.7–3.1)
LYMPHOCYTES NFR BLD AUTO: 39 %
MCH RBC QN AUTO: 34.6 PG (ref 26.6–33)
MCHC RBC AUTO-ENTMCNC: 34.3 G/DL (ref 31.5–35.7)
MCV RBC AUTO: 101 FL (ref 79–97)
MONOCYTES # BLD AUTO: 0.8 X10E3/UL (ref 0.1–0.9)
MONOCYTES NFR BLD AUTO: 14 %
NEUTROPHILS # BLD AUTO: 2.4 X10E3/UL (ref 1.4–7)
NEUTROPHILS NFR BLD AUTO: 42 %
PLATELET # BLD AUTO: 243 X10E3/UL (ref 150–450)
POTASSIUM SERPL-SCNC: 4.2 MMOL/L (ref 3.5–5.2)
PROT SERPL-MCNC: 6.8 G/DL (ref 6–8.5)
RBC # BLD AUTO: 3.81 X10E6/UL (ref 3.77–5.28)
SODIUM SERPL-SCNC: 136 MMOL/L (ref 134–144)
WBC # BLD AUTO: 5.8 X10E3/UL (ref 3.4–10.8)

## 2020-02-13 NOTE — TELEPHONE ENCOUNTER
Returned call to pt to inform her that we are still waiting on results. As soon as they come in we will give her a call.

## 2020-02-16 LAB
BACTERIA SPEC CULT: NORMAL
BACTERIA SPEC CULT: NORMAL
C DIFF TOX A+B STL QL IA: POSITIVE
CAMPYLOBACTER STL CULT: NORMAL
E COLI SXT STL QL IA: NEGATIVE
Lab: NORMAL
O+P SPEC MICRO: NORMAL
O+P STL CONC: NORMAL
SALM + SHIG STL CULT: NORMAL

## 2020-02-17 DIAGNOSIS — A04.72 C. DIFFICILE COLITIS: Primary | ICD-10-CM

## 2020-02-17 RX ORDER — VANCOMYCIN HYDROCHLORIDE 125 MG/1
125 CAPSULE ORAL 4 TIMES DAILY
Qty: 10 CAPSULE | Refills: 0 | Status: SHIPPED | OUTPATIENT
Start: 2020-02-17 | End: 2020-02-18

## 2020-02-18 ENCOUNTER — TELEPHONE (OUTPATIENT)
Dept: FAMILY MEDICINE CLINIC | Facility: CLINIC | Age: 55
End: 2020-02-18

## 2020-02-18 DIAGNOSIS — A04.72 C. DIFFICILE COLITIS: Primary | ICD-10-CM

## 2020-02-18 RX ORDER — VANCOMYCIN HYDROCHLORIDE 125 MG/1
125 CAPSULE ORAL 4 TIMES DAILY
Qty: 40 CAPSULE | Refills: 0 | Status: SHIPPED | OUTPATIENT
Start: 2020-02-18 | End: 2020-02-28

## 2020-02-19 ENCOUNTER — HOSPITAL ENCOUNTER (OUTPATIENT)
Dept: GENERAL RADIOLOGY | Facility: HOSPITAL | Age: 55
Discharge: HOME | End: 2020-02-19
Admitting: FAMILY MEDICINE

## 2020-02-19 ENCOUNTER — HOSPITAL ENCOUNTER (OUTPATIENT)
Dept: MRI IMAGING | Facility: HOSPITAL | Age: 55
Discharge: HOME | End: 2020-02-19

## 2020-02-19 DIAGNOSIS — G89.29 CHRONIC LEFT SHOULDER PAIN: ICD-10-CM

## 2020-02-19 DIAGNOSIS — M25.512 CHRONIC LEFT SHOULDER PAIN: ICD-10-CM

## 2020-02-19 PROCEDURE — 77002 NEEDLE LOCALIZATION BY XRAY: CPT

## 2020-02-19 PROCEDURE — 25010000003 LIDOCAINE 1 % SOLUTION: Performed by: RADIOLOGY

## 2020-02-19 PROCEDURE — 25010000002 IOPAMIDOL 61 % SOLUTION: Performed by: RADIOLOGY

## 2020-02-19 PROCEDURE — 0 GADOBENATE DIMEGLUMINE 529 MG/ML SOLUTION: Performed by: RADIOLOGY

## 2020-02-19 PROCEDURE — A9577 INJ MULTIHANCE: HCPCS | Performed by: RADIOLOGY

## 2020-02-19 PROCEDURE — 73222 MRI JOINT UPR EXTREM W/DYE: CPT

## 2020-02-19 RX ORDER — LIDOCAINE HYDROCHLORIDE 10 MG/ML
10 INJECTION, SOLUTION INFILTRATION; PERINEURAL ONCE
Status: COMPLETED | OUTPATIENT
Start: 2020-02-19 | End: 2020-02-19

## 2020-02-19 RX ADMIN — LIDOCAINE HYDROCHLORIDE 3 ML: 10 INJECTION, SOLUTION INFILTRATION; PERINEURAL at 14:33

## 2020-02-19 RX ADMIN — GADOBENATE DIMEGLUMINE 0.05 ML: 529 INJECTION, SOLUTION INTRAVENOUS at 14:33

## 2020-02-19 RX ADMIN — IOPAMIDOL 1 ML: 612 INJECTION, SOLUTION INTRAVENOUS at 14:33

## 2020-02-21 ENCOUNTER — TELEPHONE (OUTPATIENT)
Dept: SPORTS MEDICINE | Facility: CLINIC | Age: 55
End: 2020-02-21

## 2020-02-21 DIAGNOSIS — M75.100 TEAR OF ROTATOR CUFF, UNSPECIFIED LATERALITY, UNSPECIFIED TEAR EXTENT, UNSPECIFIED WHETHER TRAUMATIC: Primary | ICD-10-CM

## 2020-02-21 NOTE — TELEPHONE ENCOUNTER
----- Message from Nolberto Colón MD sent at 2/21/2020 12:22 PM EST -----  There is a rotator cuff tear, please place referral to Dr. Ayala.

## 2020-02-26 ENCOUNTER — TELEPHONE (OUTPATIENT)
Dept: FAMILY MEDICINE CLINIC | Facility: CLINIC | Age: 55
End: 2020-02-26

## 2020-02-26 ENCOUNTER — RESULTS ENCOUNTER (OUTPATIENT)
Dept: FAMILY MEDICINE CLINIC | Facility: CLINIC | Age: 55
End: 2020-02-26

## 2020-02-26 DIAGNOSIS — A04.72 C. DIFFICILE COLITIS: Primary | ICD-10-CM

## 2020-02-26 DIAGNOSIS — A04.72 C. DIFFICILE COLITIS: ICD-10-CM

## 2020-02-26 NOTE — TELEPHONE ENCOUNTER
Called pt to inform her that the orders have been placed for the stool C. difficile sample to be collected.    Per Anabella,  Have her come by office to  the specimen kit.  If she becomes symptomatic, she will need to be reevaluated.      Pt stated she would come by tomorrow 2/27/20 to  the kit.

## 2020-02-26 NOTE — TELEPHONE ENCOUNTER
Pt has been notified that there will be no additional refills given at this time of her vancomycin. She voiced understanding and also wanted to clarify if she needed to make an appointment to be seen or If she can just  the stool kit to collect specimen to verify infection has cleared up.

## 2020-02-26 NOTE — TELEPHONE ENCOUNTER
1.  I agree with Dr. Robert.  No additional antibiotic is needed at this time.  2.  She would need to recheck a stool specimen to verify resolution of her infection.

## 2020-02-26 NOTE — TELEPHONE ENCOUNTER
Notify patient I have placed orders for the stool C. difficile sample to be collected.  Have her come by office to  the specimen kit.  If she becomes symptomatic, she will need to be reevaluated.

## 2020-02-26 NOTE — TELEPHONE ENCOUNTER
She is at increased risk of C diff reoccurrence with further antibiotic use. If she develops uri symptoms in the next few months I would advise not using an antibiotic. Antibiotic overuse can lead to issues-If she is asymptomatic she should not get a refill of vanc.

## 2020-02-26 NOTE — TELEPHONE ENCOUNTER
Patient called wanting a refill for vancomycin. She stated that she was not having any issues and was feeling fine but that she wanted to have is on hand just in case she had a reoccurring episode. She said she read online that C-diff can reoccur in a month or so and with the coronavirus going around she wanted to be proactive. I informed her that I would send a message but that I was not sure that it would be refilled due to her not having symptoms any longer.     Also, when she was called on 2/18/20, to advise that she f/u with her PCP Anabella to ensure everything was ok pt refused stating she would not make a f/u appointment if nothing was wrong.

## 2020-02-29 LAB — C DIFF TOX A+B STL QL IA: NEGATIVE

## 2020-03-13 ENCOUNTER — OFFICE VISIT (OUTPATIENT)
Dept: FAMILY MEDICINE CLINIC | Facility: CLINIC | Age: 55
End: 2020-03-13

## 2020-03-13 VITALS
DIASTOLIC BLOOD PRESSURE: 88 MMHG | WEIGHT: 156 LBS | OXYGEN SATURATION: 98 % | BODY MASS INDEX: 26.63 KG/M2 | HEART RATE: 106 BPM | HEIGHT: 64 IN | RESPIRATION RATE: 16 BRPM | SYSTOLIC BLOOD PRESSURE: 138 MMHG | TEMPERATURE: 98.2 F

## 2020-03-13 DIAGNOSIS — F41.0 PANIC: Primary | ICD-10-CM

## 2020-03-13 PROBLEM — J01.00 ACUTE MAXILLARY SINUSITIS: Status: RESOLVED | Noted: 2020-01-27 | Resolved: 2020-03-13

## 2020-03-13 PROCEDURE — 99213 OFFICE O/P EST LOW 20 MIN: CPT | Performed by: PHYSICIAN ASSISTANT

## 2020-03-13 RX ORDER — HYDROXYZINE HYDROCHLORIDE 25 MG/1
25 TABLET, FILM COATED ORAL EVERY 8 HOURS PRN
Qty: 60 TABLET | Refills: 0 | Status: SHIPPED | OUTPATIENT
Start: 2020-03-13 | End: 2021-03-22

## 2020-03-13 NOTE — PROGRESS NOTES
"Subjective   Lynn R Speaker is a 54 y.o. female presents for   Chief Complaint   Patient presents with   • Anxiety     panic       History of Present Illness     Lynn is a 54-year-old female who presents for increased anxiety and panic issues that started the last week.  States she is very anxious and panicking about the coronavirus.  States she is concerned about financial as well as health issues.  States she feels like she is healthy but is concerned and panicking over what may come.  She has been taking her Pristiq medication but has had breakthrough panic attacks.  Denied any suicidal or homicidal ideation.  Her appetite and sleep have been normal.    The following portions of the patient's history were reviewed and updated as appropriate: allergies, current medications, past family history, past medical history, past social history, past surgical history and problem list.    Review of Systems   Constitutional: Negative.    HENT: Negative.    Eyes: Negative.    Respiratory: Negative.    Cardiovascular: Negative.    Gastrointestinal: Negative.    Endocrine: Negative.    Genitourinary: Negative.    Musculoskeletal: Negative.    Skin: Negative.    Allergic/Immunologic: Negative.    Neurological: Negative.    Hematological: Negative.    Psychiatric/Behavioral: Negative for sleep disturbance and suicidal ideas. The patient is nervous/anxious.    All other systems reviewed and are negative.        Vitals:    03/13/20 1445   BP: 138/88   Pulse: 106   Resp: 16   Temp: 98.2 °F (36.8 °C)   SpO2: 98%   Weight: 70.8 kg (156 lb)   Height: 162.6 cm (64\")     Wt Readings from Last 3 Encounters:   03/13/20 70.8 kg (156 lb)   02/12/20 72.2 kg (159 lb 4 oz)   01/27/20 75.3 kg (166 lb)     BP Readings from Last 3 Encounters:   03/13/20 138/88   02/12/20 120/80   01/27/20 118/84     Social History     Socioeconomic History   • Marital status: Single     Spouse name: Not on file   • Number of children: Not on file   • Years of " education: Not on file   • Highest education level: Not on file   Tobacco Use   • Smoking status: Never Smoker   • Smokeless tobacco: Never Used   Substance and Sexual Activity   • Alcohol use: Yes     Comment: occasional   • Drug use: No   • Sexual activity: Never       Allergies   Allergen Reactions   • No Known Drug Allergy        Body mass index is 26.78 kg/m².    Objective   Physical Exam   Constitutional: She is oriented to person, place, and time. Vital signs are normal. She appears well-developed and well-nourished.   Neck: Trachea normal and phonation normal. Neck supple. No tracheal tenderness present. No tracheal deviation and no edema present.   Cardiovascular: Normal rate, regular rhythm, S1 normal, S2 normal, normal heart sounds and normal pulses.   No murmur heard.  Pulmonary/Chest: Effort normal and breath sounds normal.   Abdominal: Soft. Normal appearance, normal aorta and bowel sounds are normal. There is no hepatomegaly. There is no tenderness.   Neurological: She is alert and oriented to person, place, and time.   Skin: Skin is warm, dry and intact. Capillary refill takes less than 2 seconds.   Psychiatric: Her speech is normal and behavior is normal. Judgment and thought content normal. Her mood appears anxious. Cognition and memory are normal.       Assessment/Plan   Lynn was seen today for anxiety.    Diagnoses and all orders for this visit:    Panic  -     hydrOXYzine (ATARAX) 25 MG tablet; Take 1 tablet by mouth Every 8 (Eight) Hours As Needed for Anxiety.    Ms. Bailey Speaker was seen in office today for panic anxiety.  We will add hydroxyzine as needed to her current Pristiq medication.  Have encouraged her to try meditation and exercising to help with her panic attacks.  She will return to office if symptoms do not improve.      TRA Munoz [facility]  [address] [facility] [address]  Dept:  [phone]  Dept Fax: [phone]  Loc: [phone]  Loc Fax: [phone]

## 2020-03-30 ENCOUNTER — TELEPHONE (OUTPATIENT)
Dept: FAMILY MEDICINE CLINIC | Facility: CLINIC | Age: 55
End: 2020-03-30

## 2020-03-30 NOTE — TELEPHONE ENCOUNTER
Pt calling, states anxiety medication given recently is not helping her. Would like to know if we can call in something else for her.    Please advise.

## 2020-03-30 NOTE — TELEPHONE ENCOUNTER
Notify patient if she wants Klonopin or Xanax medication for her panic anxiety, she will need to come by office today to leave a urine drug screen and signed the appropriate agreement and consent forms for the medication.  This is a controlled substance and we have to complete these tasks before prescribing the medication.    Please do travel history if she is coming in to leave a urine sample

## 2020-05-27 DIAGNOSIS — F33.1 MODERATE EPISODE OF RECURRENT MAJOR DEPRESSIVE DISORDER (HCC): ICD-10-CM

## 2020-05-27 RX ORDER — DESVENLAFAXINE SUCCINATE 50 MG/1
TABLET, EXTENDED RELEASE ORAL
Qty: 90 TABLET | Refills: 1 | Status: SHIPPED | OUTPATIENT
Start: 2020-05-27 | End: 2020-11-04 | Stop reason: DRUGHIGH

## 2020-05-28 DIAGNOSIS — E78.2 MIXED HYPERLIPIDEMIA: ICD-10-CM

## 2020-05-28 DIAGNOSIS — E55.9 VITAMIN D INSUFFICIENCY: Primary | ICD-10-CM

## 2020-05-28 DIAGNOSIS — Z11.59 NEED FOR HEPATITIS C SCREENING TEST: ICD-10-CM

## 2020-05-28 DIAGNOSIS — Z00.00 ENCOUNTER FOR ANNUAL PHYSICAL EXAM: ICD-10-CM

## 2020-05-30 LAB
25(OH)D3+25(OH)D2 SERPL-MCNC: 14.8 NG/ML (ref 30–100)
ALBUMIN SERPL-MCNC: 4.6 G/DL (ref 3.5–5.2)
ALBUMIN/GLOB SERPL: 1.8 G/DL
ALP SERPL-CCNC: 77 U/L (ref 39–117)
ALT SERPL-CCNC: 40 U/L (ref 1–33)
AST SERPL-CCNC: 23 U/L (ref 1–32)
BASOPHILS # BLD AUTO: 0.03 10*3/MM3 (ref 0–0.2)
BASOPHILS NFR BLD AUTO: 0.6 % (ref 0–1.5)
BILIRUB SERPL-MCNC: 0.4 MG/DL (ref 0.2–1.2)
BUN SERPL-MCNC: 9 MG/DL (ref 6–20)
BUN/CREAT SERPL: 9.9 (ref 7–25)
CALCIUM SERPL-MCNC: 10 MG/DL (ref 8.6–10.5)
CHLORIDE SERPL-SCNC: 100 MMOL/L (ref 98–107)
CHOLEST SERPL-MCNC: 267 MG/DL (ref 0–200)
CHOLEST/HDLC SERPL: 5.34 {RATIO}
CO2 SERPL-SCNC: 25.3 MMOL/L (ref 22–29)
CREAT SERPL-MCNC: 0.91 MG/DL (ref 0.57–1)
EOSINOPHIL # BLD AUTO: 0.1 10*3/MM3 (ref 0–0.4)
EOSINOPHIL NFR BLD AUTO: 1.9 % (ref 0.3–6.2)
ERYTHROCYTE [DISTWIDTH] IN BLOOD BY AUTOMATED COUNT: 12.3 % (ref 12.3–15.4)
GLOBULIN SER CALC-MCNC: 2.6 GM/DL
GLUCOSE SERPL-MCNC: 104 MG/DL (ref 65–99)
HCT VFR BLD AUTO: 39.1 % (ref 34–46.6)
HCV AB S/CO SERPL IA: <0.1 S/CO RATIO (ref 0–0.9)
HDLC SERPL-MCNC: 50 MG/DL (ref 40–60)
HGB BLD-MCNC: 14 G/DL (ref 12–15.9)
IMM GRANULOCYTES # BLD AUTO: 0.01 10*3/MM3 (ref 0–0.05)
IMM GRANULOCYTES NFR BLD AUTO: 0.2 % (ref 0–0.5)
LDLC SERPL CALC-MCNC: 174 MG/DL (ref 0–100)
LYMPHOCYTES # BLD AUTO: 2.5 10*3/MM3 (ref 0.7–3.1)
LYMPHOCYTES NFR BLD AUTO: 48.1 % (ref 19.6–45.3)
MCH RBC QN AUTO: 36.2 PG (ref 26.6–33)
MCHC RBC AUTO-ENTMCNC: 35.8 G/DL (ref 31.5–35.7)
MCV RBC AUTO: 101 FL (ref 79–97)
MONOCYTES # BLD AUTO: 0.35 10*3/MM3 (ref 0.1–0.9)
MONOCYTES NFR BLD AUTO: 6.7 % (ref 5–12)
NEUTROPHILS # BLD AUTO: 2.21 10*3/MM3 (ref 1.7–7)
NEUTROPHILS NFR BLD AUTO: 42.5 % (ref 42.7–76)
NRBC BLD AUTO-RTO: 0 /100 WBC (ref 0–0.2)
PLATELET # BLD AUTO: 189 10*3/MM3 (ref 140–450)
POTASSIUM SERPL-SCNC: 4.4 MMOL/L (ref 3.5–5.2)
PROT SERPL-MCNC: 7.2 G/DL (ref 6–8.5)
RBC # BLD AUTO: 3.87 10*6/MM3 (ref 3.77–5.28)
SODIUM SERPL-SCNC: 138 MMOL/L (ref 136–145)
TRIGL SERPL-MCNC: 215 MG/DL (ref 0–150)
VLDLC SERPL CALC-MCNC: 43 MG/DL
WBC # BLD AUTO: 5.2 10*3/MM3 (ref 3.4–10.8)

## 2020-06-01 ENCOUNTER — OFFICE VISIT (OUTPATIENT)
Dept: ORTHOPEDIC SURGERY | Facility: CLINIC | Age: 55
End: 2020-06-01

## 2020-06-01 VITALS — BODY MASS INDEX: 29.23 KG/M2 | HEIGHT: 63 IN | WEIGHT: 165 LBS | TEMPERATURE: 98.4 F

## 2020-06-01 DIAGNOSIS — M25.512 LEFT SHOULDER PAIN, UNSPECIFIED CHRONICITY: Primary | ICD-10-CM

## 2020-06-01 LAB
Lab: NORMAL
VIT B12 SERPL-MCNC: 492 PG/ML (ref 211–946)
WRITTEN AUTHORIZATION: NORMAL

## 2020-06-01 PROCEDURE — 73030 X-RAY EXAM OF SHOULDER: CPT | Performed by: ORTHOPAEDIC SURGERY

## 2020-06-01 PROCEDURE — 99204 OFFICE O/P NEW MOD 45 MIN: CPT | Performed by: ORTHOPAEDIC SURGERY

## 2020-06-01 RX ORDER — CEFAZOLIN SODIUM 2 G/100ML
2 INJECTION, SOLUTION INTRAVENOUS ONCE
Status: CANCELLED | OUTPATIENT
Start: 2020-08-11 | End: 2020-06-01

## 2020-06-01 NOTE — PROGRESS NOTES
Patient: Lynn Gomez    YOB: 1965    Medical Record Number: [id number]    Chief Complaints:  Left shoulder pain    History of Present Illness:     55 y.o. female patient who presents with a complaint of left shoulder pain and weakness.  She reports that the symptoms first started about a year ago.  She does not recall any injury.  She reports having undergone 2 previous cortisone injections which did help transiently.  Pain is moderate, constant and aching.  The pain is worse with certain reaching and lifting movements as well as at night.  She denies any other alleviating factors.  She denies any shooting pain down the arm, distal weakness, numbness or paresthesias.    Allergies:   Allergies   Allergen Reactions   • No Known Drug Allergy        Home Medications:    Current Outpatient Medications:   •  desvenlafaxine (PRISTIQ) 50 MG 24 hr tablet, TAKE 1 TABLET BY MOUTH EVERY DAY, Disp: 90 tablet, Rfl: 1  •  hydrOXYzine (ATARAX) 25 MG tablet, Take 1 tablet by mouth Every 8 (Eight) Hours As Needed for Anxiety., Disp: 60 tablet, Rfl: 0  •  zolpidem (AMBIEN) 10 MG tablet, , Disp: , Rfl:     Past Medical History:   Diagnosis Date   • Allergic mild   • Anxiety    • Depression        Past Surgical History:   Procedure Laterality Date   •  SECTION     • ENDOMETRIAL ABLATION W/ NOVASURE     • REDUCTION MAMMAPLASTY Bilateral            Social History     Occupational History   • Not on file   Tobacco Use   • Smoking status: Never Smoker   • Smokeless tobacco: Never Used   Substance and Sexual Activity   • Alcohol use: Yes     Comment: occasional   • Drug use: No   • Sexual activity: Never      Social History     Social History Narrative   • Not on file       Family History   Problem Relation Age of Onset   • Hypertension Father    • Hypertension Paternal Grandmother        Review of Systems:      Constitutional: Denies fever, shaking or chills   Eyes: Denies change in visual acuity   HEENT:  "Denies nasal congestion or sore throat   Respiratory: Denies cough or shortness of breath   Cardiovascular: Denies chest pain or edema  Endocrine: Denies tremors, palpitations, intolerance of heat or cold, polyuria, polydipsia.  GI: Denies abdominal pain, nausea, vomiting, bloody stools or diarrhea  : Denies frequency, urgency, incontinence, retention, or nocturia.  Musculoskeletal: Denies numbness, tingling or loss of motor function except as above  Integument: Denies rash, lesion or ulceration   Neurologic: Denies headache or focal weakness, deficits  Heme: Denies spontaneous or excessive bleeding, epistaxis, hematuria, melena, fatigue, enlarged or tender lymph nodes.      All other pertinent positives and negatives as noted above in HPI.    Physical Exam:   55 y.o. female    Vitals:    06/01/20 1101   Temp: 98.4 °F (36.9 °C)   TempSrc: Temporal   Weight: 74.8 kg (165 lb)   Height: 160 cm (63\")     General:  Patient is awake and alert.  Appears in no acute distress or discomfort.    Psych:  Affect and demeanor are appropriate.    Eyes:  Conjunctiva and sclera appear grossly normal.  Eyes track well and EOM seem to be intact.    Ears:  No gross abnormalities.  Hearing adequate for the exam.    Cardiovascular:  Regular rate and rhythm.    Lungs:  Good chest expansion.  Breathing unlabored.    Lymph:  No palpable adenopathy about neck or axilla.    Neck:  Supple.  Normal ROM.  Negative Spurling's for shoulder or arm pain.    Left upper extremity:  Skin is benign.  No gross abnormalities on inspection including any atrophy, swellings, or masses.  No palpable masses or adenopathy.  No focal areas of significant tenderness.  Full shoulder motion.  No evident instability or apprehension.  Mildly positive Neer Cheek.  Weakness with forward elevation in the scapular plane.  Good strength with internal and external rotation.  Good strength in the deltoid, biceps, triceps, and .  Intact sensation throughout the arm. "  Brisk cap refill.  Palpable radial pulse.  Good skin turgor.         Radiology:   AP, scapular Y, and axillary views of the left shoulder are ordered by myself and reviewed to evaluate the patient's complaint.  No comparison films are immediately available.  The x-rays show no obvious acute abnormalities, lesions, masses, significant degenerative changes, or other concerning findings.  The acromiohumeral interval is normal.  Glenoid version appears normal as well.  MR arthrogram left shoulder is reviewed along with the associated report.  Findings are listed below.     IMPRESSION:  1. 1.7 cm full-thickness insertional tear of the anterior supraspinatus  tendon with myotendinous retraction.  2. Mild AC joint arthritis.  3. Suspect small degenerative tear along the base of the superior  Labrum.    Assessment/Plan:   Left rotator cuff tear    We discussed the natural history of rotator cuff tears and risk of potential tear progression.  We thoroughly discussed options in detail including conservative versus further work-up and potential surgical options. She has acknowledged understanding of this information.  We had a thorough discussion regarding the risks, benefits and alternatives to an arthroscopic rotator cuff repair.  I explained that surgical risks include infection, hematoma, anchor related complications including failure of fixation, loosening, cutout of the anchors, chondrolysis, rotator cuff re-tear necessitating revision surgery, persistent pain and/or loss of motion, iatrogenic nerve and/or blood vessel injury resulting in permanent weakness, numbness or dysfunction, RSD, DVT, PE, positioning related neuropraxia, and anesthesia related complications resulting in death.  We further discussed the possible need to address the biceps with a possible tenotomy versus tenodesis.  We discussed the fact that if the biceps demonstrates significant pathology in the groove then I would plan to perform a tenotomy  which could result in uziel deformity or persistent pain, weakness or cramping.  We also discussed possible risks with a tenodesis as well including screw related complications including cutout, chondrolysis, failure of fixation with re-tear of the biceps, fracture and possible iatrogenic nerve injury.  The patient voiced understanding of the risks, benefits, and alternative forms of treatment that were discussed and the patient consents to proceed.  She would like to do this sometime around November.  We will look at getting this scheduled for her at her request.  I do want to see her back preoperatively though.    Satish Ayala MD    06/01/2020    CC to Anabella Farris PA-C

## 2020-06-22 DIAGNOSIS — E55.9 VITAMIN D INSUFFICIENCY: Primary | ICD-10-CM

## 2020-06-22 DIAGNOSIS — E55.9 VITAMIN D DEFICIENCY: ICD-10-CM

## 2020-06-22 RX ORDER — ERGOCALCIFEROL 1.25 MG/1
50000 CAPSULE ORAL
Qty: 12 CAPSULE | Refills: 1 | Status: SHIPPED | OUTPATIENT
Start: 2020-06-22 | End: 2020-11-04

## 2020-07-20 ENCOUNTER — TRANSCRIBE ORDERS (OUTPATIENT)
Dept: ADMINISTRATIVE | Facility: HOSPITAL | Age: 55
End: 2020-07-20

## 2020-07-20 DIAGNOSIS — Z12.31 VISIT FOR SCREENING MAMMOGRAM: Primary | ICD-10-CM

## 2020-07-27 PROBLEM — M25.512 LEFT SHOULDER PAIN: Status: ACTIVE | Noted: 2020-07-27

## 2020-08-03 ENCOUNTER — TRANSCRIBE ORDERS (OUTPATIENT)
Dept: PREADMISSION TESTING | Facility: HOSPITAL | Age: 55
End: 2020-08-03

## 2020-08-03 DIAGNOSIS — Z01.818 OTHER SPECIFIED PRE-OPERATIVE EXAMINATION: Primary | ICD-10-CM

## 2020-08-04 ENCOUNTER — TELEPHONE (OUTPATIENT)
Dept: ORTHOPEDIC SURGERY | Facility: CLINIC | Age: 55
End: 2020-08-04

## 2020-08-05 ENCOUNTER — APPOINTMENT (OUTPATIENT)
Dept: PREADMISSION TESTING | Facility: HOSPITAL | Age: 55
End: 2020-08-05

## 2020-08-08 ENCOUNTER — APPOINTMENT (OUTPATIENT)
Dept: LAB | Facility: HOSPITAL | Age: 55
End: 2020-08-08

## 2020-08-31 ENCOUNTER — HOSPITAL ENCOUNTER (OUTPATIENT)
Dept: MAMMOGRAPHY | Facility: HOSPITAL | Age: 55
Discharge: HOME | End: 2020-08-31

## 2020-09-01 ENCOUNTER — HOSPITAL ENCOUNTER (OUTPATIENT)
Dept: MAMMOGRAPHY | Facility: HOSPITAL | Age: 55
Discharge: HOME | End: 2020-09-01
Admitting: OBSTETRICS & GYNECOLOGY

## 2020-09-01 DIAGNOSIS — Z12.31 VISIT FOR SCREENING MAMMOGRAM: ICD-10-CM

## 2020-09-01 PROCEDURE — 77067 SCR MAMMO BI INCL CAD: CPT

## 2020-09-01 PROCEDURE — 77063 BREAST TOMOSYNTHESIS BI: CPT

## 2020-10-06 ENCOUNTER — TELEPHONE (OUTPATIENT)
Dept: FAMILY MEDICINE CLINIC | Facility: CLINIC | Age: 55
End: 2020-10-06

## 2020-10-06 ENCOUNTER — OFFICE VISIT (OUTPATIENT)
Dept: FAMILY MEDICINE CLINIC | Facility: CLINIC | Age: 55
End: 2020-10-06

## 2020-10-06 ENCOUNTER — RESULTS ENCOUNTER (OUTPATIENT)
Dept: FAMILY MEDICINE CLINIC | Facility: CLINIC | Age: 55
End: 2020-10-06

## 2020-10-06 DIAGNOSIS — Z11.9 SCREENING EXAMINATION FOR UNSPECIFIED INFECTIOUS DISEASE: ICD-10-CM

## 2020-10-06 DIAGNOSIS — R53.83 FATIGUE, UNSPECIFIED TYPE: ICD-10-CM

## 2020-10-06 DIAGNOSIS — R52 GENERALIZED BODY ACHES: ICD-10-CM

## 2020-10-06 DIAGNOSIS — B34.9 VIRAL ILLNESS: ICD-10-CM

## 2020-10-06 DIAGNOSIS — B34.9 VIRAL ILLNESS: Primary | ICD-10-CM

## 2020-10-06 PROCEDURE — 99441 PR PHYS/QHP TELEPHONE EVALUATION 5-10 MIN: CPT | Performed by: NURSE PRACTITIONER

## 2020-10-06 NOTE — TELEPHONE ENCOUNTER
PATIENT STATES SHE IS ACHING AND VERY TIRED. SHE ALSO HAS A HEAD ACHE. SHE WAS WANTING TO COME IN FOR A VISIT . SHE IS  NEEDING A CALL BACK FOR INSTRUCTION.    PLEASE ADVISE  [phone]

## 2020-10-06 NOTE — TELEPHONE ENCOUNTER
Returned call to pt. She stated that she is just having fatigue, headache and body aches. She also stated that her symptoms started Wednesday and she lenka to [facility] on Thursday to have a COVID test performed. Results came back Friday negative. She has had no relief and fatigue has gotten worse. Pt has been scheduled for a telephone visit today at 1:45.

## 2020-10-06 NOTE — PROGRESS NOTES
Lynn Gomez is a 55 y.o. who presents today for a telephone visit with complaints of headache, fatigue and body aches.   You have chosen to receive care through a telephone visit. Do you consent to use a telephone visit for your medical care today? Yes  Started: 1352  End:  1359  Time spent caring for the patient was 5 - 10 min.    Patient ID: Lynn Gomez is a 55 y.o. female     Subjective     Chief Complaint   Patient presents with   • Headache   • Fatigue       History of Present Illness    Lynn Gomez presents for headache, body aches, and fatigue.   Onset of symptoms Wednesday, 9/30/2020.  Underwent Covid testing on Thursday.  Found out results were negative on Friday.  Still not feeling well.  Increase fatigue and body aches.  Headache.  Unsure if running a fever due to no thermometer at home.  She has been working from home.  No known Covid exposure.    She denies any complaints of fever, chills, cough, chest pain, shortness of air, abdominal pain, nausea, or any other concerns.     The following portions of the patient's history were reviewed and updated as appropriate: allergies, current medications, past family history, past medical history, past social history, past surgical history and problem list.       Review of Systems   Constitution: Positive for malaise/fatigue. Negative for fever.        Body aches   HENT: Negative for congestion.    Eyes: Negative.    Cardiovascular: Negative.    Respiratory: Negative.  Negative for cough and shortness of breath.    Endocrine: Negative.    Hematologic/Lymphatic: Negative.    Skin: Negative.    Musculoskeletal: Negative.    Gastrointestinal: Negative.    Genitourinary: Negative.    Neurological: Positive for headaches.   Psychiatric/Behavioral: Negative.        There were no vitals filed for this visit.    There were no vitals filed for this visit.  There is no height or weight on file to calculate BMI.    Results for orders placed or performed in  visit on 05/28/20   Comprehensive Metabolic Panel    Specimen: Blood   Result Value Ref Range    Glucose 104 (H) 65 - 99 mg/dL    BUN 9 6 - 20 mg/dL    Creatinine 0.91 0.57 - 1.00 mg/dL    eGFR Non African Am 65 >60 mL/min/1.73    eGFR African Am 78 >60 mL/min/1.73    BUN/Creatinine Ratio 9.9 7.0 - 25.0    Sodium 138 136 - 145 mmol/L    Potassium 4.4 3.5 - 5.2 mmol/L    Chloride 100 98 - 107 mmol/L    Total CO2 25.3 22.0 - 29.0 mmol/L    Calcium 10.0 8.6 - 10.5 mg/dL    Total Protein 7.2 6.0 - 8.5 g/dL    Albumin 4.60 3.50 - 5.20 g/dL    Globulin 2.6 gm/dL    A/G Ratio 1.8 g/dL    Total Bilirubin 0.4 0.2 - 1.2 mg/dL    Alkaline Phosphatase 77 39 - 117 U/L    AST (SGOT) 23 1 - 32 U/L    ALT (SGPT) 40 (H) 1 - 33 U/L   Lipid Panel With / Chol / HDL Ratio    Specimen: Blood   Result Value Ref Range    Total Cholesterol 267 (H) 0 - 200 mg/dL    Triglycerides 215 (H) 0 - 150 mg/dL    HDL Cholesterol 50 40 - 60 mg/dL    VLDL Cholesterol 43 mg/dL    LDL Cholesterol  174 (H) 0 - 100 mg/dL    Chol/HDL Ratio 5.34    Hepatitis C antibody    Specimen: Blood   Result Value Ref Range    Hep C Virus Ab <0.1 0.0 - 0.9 s/co ratio   Vitamin D 25 hydroxy    Specimen: Blood   Result Value Ref Range    25 Hydroxy, Vitamin D 14.8 (L) 30.0 - 100.0 ng/ml   Vitamin B12   Result Value Ref Range    Vitamin B-12 492 211 - 946 pg/mL   Please Note   Result Value Ref Range    Please note Comment    Written Authorization   Result Value Ref Range    Written Authorization Comment    CBC & Differential    Specimen: Blood   Result Value Ref Range    WBC 5.20 3.40 - 10.80 10*3/mm3    RBC 3.87 3.77 - 5.28 10*6/mm3    Hemoglobin 14.0 12.0 - 15.9 g/dL    Hematocrit 39.1 34.0 - 46.6 %    .0 (H) 79.0 - 97.0 fL    MCH 36.2 (H) 26.6 - 33.0 pg    MCHC 35.8 (H) 31.5 - 35.7 g/dL    RDW 12.3 12.3 - 15.4 %    Platelets 189 140 - 450 10*3/mm3    Neutrophil Rel % 42.5 (L) 42.7 - 76.0 %    Lymphocyte Rel % 48.1 (H) 19.6 - 45.3 %    Monocyte Rel % 6.7 5.0 - 12.0  %    Eosinophil Rel % 1.9 0.3 - 6.2 %    Basophil Rel % 0.6 0.0 - 1.5 %    Neutrophils Absolute 2.21 1.70 - 7.00 10*3/mm3    Lymphocytes Absolute 2.50 0.70 - 3.10 10*3/mm3    Monocytes Absolute 0.35 0.10 - 0.90 10*3/mm3    Eosinophils Absolute 0.10 0.00 - 0.40 10*3/mm3    Basophils Absolute 0.03 0.00 - 0.20 10*3/mm3    Immature Granulocyte Rel % 0.2 0.0 - 0.5 %    Immature Grans Absolute 0.01 0.00 - 0.05 10*3/mm3    nRBC 0.0 0.0 - 0.2 /100 WBC       Objective     Physical Exam  Constitutional:       General: She is not in acute distress.  Neurological:      Mental Status: She is alert and oriented to person, place, and time.         Assessment/Plan     Assessment/Plan     Lynn was seen today for headache and fatigue.    Diagnoses and all orders for this visit:    Viral illness  -     COVID-19,[facility],NP/OP Swab in [employer] or ESwab 72 HR TAT - Swab, Nasopharynx; Future    Fatigue, unspecified type  -     COVID-19,[facility],NP/OP Swab in Transport Media or ESwab 72 HR TAT - Swab, Nasopharynx; Future    Generalized body aches  -     COVID-19,[facility],NP/OP Swab in [employer] Media or ESwab 72 HR TAT - Swab, Nasopharynx; Future    Screening examination for unspecified infectious disease  -     COVID-19,[facility],NP/OP Swab in Transport Media or ESwab 72 HR TAT - Swab, Nasopharynx; Future      Summary:  Lynn Gomez presented for telephone visit due to worsening body aches, fatigue, and headache.  Instructed may have been tested to early for Covid.  Offered repeat testing however she is unsure.  Went ahead and placed order and can cancel if not completed.  In the meantime, instructed to get plenty of rest, drink plenty of fluids, Tylenol as needed.  Advised to obtain a thermometer.      In the meantime, instructed to contact us sooner for any problems or concerns.    Patient Instructions   Viral Illness, Adult  Viruses are tiny germs that can get into a person's body and cause illness. There are many different  types of viruses, and they cause many types of illness. Viral illnesses can range from mild to severe. They can affect various parts of the body.  Common illnesses that are caused by a virus include colds and the flu. Viral illnesses also include serious conditions such as HIV/AIDS (human immunodeficiency virus/acquired immunodeficiency syndrome). A few viruses have been linked to certain cancers.  What are the causes?  Many types of viruses can cause illness. Viruses invade cells in your body, multiply, and cause the infected cells to malfunction or die. When the cell dies, it releases more of the virus. When this happens, you develop symptoms of the illness, and the virus continues to spread to other cells. If the virus takes over the function of the cell, it can cause the cell to divide and grow out of control, as is the case when a virus causes cancer.  Different viruses get into the body in different ways. You can get a virus by:  · Swallowing food or water that is contaminated with the virus.  · Breathing in droplets that have been coughed or sneezed into the air by an infected person.  · Touching a surface that has been contaminated with the virus and then touching your eyes, nose, or mouth.  · Being bitten by an insect or animal that carries the virus.  · Having sexual contact with a person who is infected with the virus.  · Being exposed to blood or fluids that contain the virus, either through an open cut or during a transfusion.  If a virus enters your body, your body's defense system (immune system) will try to fight the virus. You may be at higher risk for a viral illness if your immune system is weak.  What are the signs or symptoms?  Symptoms vary depending on the type of virus and the location of the cells that it invades. Common symptoms of the main types of viral illnesses include:  Cold and flu viruses  · Fever.  · Headache.  · Sore throat.  · Muscle aches.  · Nasal  congestion.  · Cough.  Digestive system (gastrointestinal) viruses  · Fever.  · Abdominal pain.  · Nausea.  · Diarrhea.  Liver viruses (hepatitis)  · Loss of appetite.  · Tiredness.  · Yellowing of the skin (jaundice).  Brain and spinal cord viruses  · Fever.  · Headache.  · Stiff neck.  · Nausea and vomiting.  · Confusion or sleepiness.  Skin viruses  · Warts.  · Itching.  · Rash.  Sexually transmitted viruses  · Discharge.  · Swelling.  · Redness.  · Rash.  How is this treated?  Viruses can be difficult to treat because they live within cells. Antibiotic medicines do not treat viruses because these drugs do not get inside cells. Treatment for a viral illness may include:  · Resting and drinking plenty of fluids.  · Medicines to relieve symptoms. These can include over-the-counter medicine for pain and fever, medicines for cough or congestion, and medicines to relieve diarrhea.  · Antiviral medicines. These drugs are available only for certain types of viruses. They may help reduce flu symptoms if taken early. There are also many antiviral medicines for hepatitis and HIV/AIDS.  Some viral illnesses can be prevented with vaccinations. A common example is the flu shot.  Follow these instructions at home:  Medicines    · Take over-the-counter and prescription medicines only as told by your health care provider.  · If you were prescribed an antiviral medicine, take it as told by your health care provider. Do not stop taking the medicine even if you start to feel better.  · Be aware of when antibiotics are needed and when they are not needed. Antibiotics do not treat viruses. If your health care provider thinks that you may have a bacterial infection as well as a viral infection, you may get an antibiotic.  ? Do not ask for an antibiotic prescription if you have been diagnosed with a viral illness. That will not make your illness go away faster.  ? Frequently taking antibiotics when they are not needed can lead to  antibiotic resistance. When this develops, the medicine no longer works against the bacteria that it normally fights.  General instructions  · Drink enough fluids to keep your urine clear or pale yellow.  · Rest as much as possible.  · Return to your normal activities as told by your health care provider. Ask your health care provider what activities are safe for you.  · Keep all follow-up visits as told by your health care provider. This is important.  How is this prevented?  Take these actions to reduce your risk of viral infection:  · Eat a healthy diet and get enough rest.  · Wash your hands often with soap and water. This is especially important when you are in public places. If soap and water are not available, use hand .  · Avoid close contact with friends and family who have a viral illness.  · If you travel to areas where viral gastrointestinal infection is common, avoid drinking water or eating raw food.  · Keep your immunizations up to date. Get a flu shot every year as told by your health care provider.  · Do not share toothbrushes, nail clippers, razors, or needles with other people.  · Always practice safe sex.    Contact a health care provider if:  · You have symptoms of a viral illness that do not go away.  · Your symptoms come back after going away.  · Your symptoms get worse.  Get help right away if:  · You have trouble breathing.  · You have a severe headache or a stiff neck.  · You have severe vomiting or abdominal pain.  This information is not intended to replace advice given to you by your health care provider. Make sure you discuss any questions you have with your health care provider.  Document Released: 04/28/2017 Document Revised: 11/30/2018 Document Reviewed: 04/28/2017  [employer] Patient Education © 2020 [employer]          Madie Heath, APRN  Family Medicine  [facility] [place]  10/06/20  14:08 EDT

## 2020-10-06 NOTE — PATIENT INSTRUCTIONS
Viral Illness, Adult  Viruses are tiny germs that can get into a person's body and cause illness. There are many different types of viruses, and they cause many types of illness. Viral illnesses can range from mild to severe. They can affect various parts of the body.  Common illnesses that are caused by a virus include colds and the flu. Viral illnesses also include serious conditions such as HIV/AIDS (human immunodeficiency virus/acquired immunodeficiency syndrome). A few viruses have been linked to certain cancers.  What are the causes?  Many types of viruses can cause illness. Viruses invade cells in your body, multiply, and cause the infected cells to malfunction or die. When the cell dies, it releases more of the virus. When this happens, you develop symptoms of the illness, and the virus continues to spread to other cells. If the virus takes over the function of the cell, it can cause the cell to divide and grow out of control, as is the case when a virus causes cancer.  Different viruses get into the body in different ways. You can get a virus by:  · Swallowing food or water that is contaminated with the virus.  · Breathing in droplets that have been coughed or sneezed into the air by an infected person.  · Touching a surface that has been contaminated with the virus and then touching your eyes, nose, or mouth.  · Being bitten by an insect or animal that carries the virus.  · Having sexual contact with a person who is infected with the virus.  · Being exposed to blood or fluids that contain the virus, either through an open cut or during a transfusion.  If a virus enters your body, your body's defense system (immune system) will try to fight the virus. You may be at higher risk for a viral illness if your immune system is weak.  What are the signs or symptoms?  Symptoms vary depending on the type of virus and the location of the cells that it invades. Common symptoms of the main types of viral illnesses  include:  Cold and flu viruses  · Fever.  · Headache.  · Sore throat.  · Muscle aches.  · Nasal congestion.  · Cough.  Digestive system (gastrointestinal) viruses  · Fever.  · Abdominal pain.  · Nausea.  · Diarrhea.  Liver viruses (hepatitis)  · Loss of appetite.  · Tiredness.  · Yellowing of the skin (jaundice).  Brain and spinal cord viruses  · Fever.  · Headache.  · Stiff neck.  · Nausea and vomiting.  · Confusion or sleepiness.  Skin viruses  · Warts.  · Itching.  · Rash.  Sexually transmitted viruses  · Discharge.  · Swelling.  · Redness.  · Rash.  How is this treated?  Viruses can be difficult to treat because they live within cells. Antibiotic medicines do not treat viruses because these drugs do not get inside cells. Treatment for a viral illness may include:  · Resting and drinking plenty of fluids.  · Medicines to relieve symptoms. These can include over-the-counter medicine for pain and fever, medicines for cough or congestion, and medicines to relieve diarrhea.  · Antiviral medicines. These drugs are available only for certain types of viruses. They may help reduce flu symptoms if taken early. There are also many antiviral medicines for hepatitis and HIV/AIDS.  Some viral illnesses can be prevented with vaccinations. A common example is the flu shot.  Follow these instructions at home:  Medicines    · Take over-the-counter and prescription medicines only as told by your health care provider.  · If you were prescribed an antiviral medicine, take it as told by your health care provider. Do not stop taking the medicine even if you start to feel better.  · Be aware of when antibiotics are needed and when they are not needed. Antibiotics do not treat viruses. If your health care provider thinks that you may have a bacterial infection as well as a viral infection, you may get an antibiotic.  ? Do not ask for an antibiotic prescription if you have been diagnosed with a viral illness. That will not make your  illness go away faster.  ? Frequently taking antibiotics when they are not needed can lead to antibiotic resistance. When this develops, the medicine no longer works against the bacteria that it normally fights.  General instructions  · Drink enough fluids to keep your urine clear or pale yellow.  · Rest as much as possible.  · Return to your normal activities as told by your health care provider. Ask your health care provider what activities are safe for you.  · Keep all follow-up visits as told by your health care provider. This is important.  How is this prevented?  Take these actions to reduce your risk of viral infection:  · Eat a healthy diet and get enough rest.  · Wash your hands often with soap and water. This is especially important when you are in public places. If soap and water are not available, use hand .  · Avoid close contact with friends and family who have a viral illness.  · If you travel to areas where viral gastrointestinal infection is common, avoid drinking water or eating raw food.  · Keep your immunizations up to date. Get a flu shot every year as told by your health care provider.  · Do not share toothbrushes, nail clippers, razors, or needles with other people.  · Always practice safe sex.    Contact a health care provider if:  · You have symptoms of a viral illness that do not go away.  · Your symptoms come back after going away.  · Your symptoms get worse.  Get help right away if:  · You have trouble breathing.  · You have a severe headache or a stiff neck.  · You have severe vomiting or abdominal pain.  This information is not intended to replace advice given to you by your health care provider. Make sure you discuss any questions you have with your health care provider.  Document Released: 04/28/2017 Document Revised: 11/30/2018 Document Reviewed: 04/28/2017  Elsevier Patient Education © 2020 [employer]

## 2020-10-14 ENCOUNTER — OFFICE VISIT (OUTPATIENT)
Dept: OBSTETRICS AND GYNECOLOGY | Facility: CLINIC | Age: 55
End: 2020-10-14

## 2020-10-14 VITALS
DIASTOLIC BLOOD PRESSURE: 72 MMHG | BODY MASS INDEX: 29 KG/M2 | SYSTOLIC BLOOD PRESSURE: 112 MMHG | WEIGHT: 163.7 LBS | HEIGHT: 63 IN

## 2020-10-14 DIAGNOSIS — Z11.51 SPECIAL SCREENING EXAMINATION FOR HUMAN PAPILLOMAVIRUS (HPV): ICD-10-CM

## 2020-10-14 DIAGNOSIS — Z01.419 PAP SMEAR, LOW-RISK: Primary | ICD-10-CM

## 2020-10-14 DIAGNOSIS — Z01.419 ENCOUNTER FOR GYNECOLOGICAL EXAMINATION WITHOUT ABNORMAL FINDING: ICD-10-CM

## 2020-10-14 DIAGNOSIS — Z13.9 SCREENING FOR CONDITION: ICD-10-CM

## 2020-10-14 PROCEDURE — 99396 PREV VISIT EST AGE 40-64: CPT | Performed by: OBSTETRICS & GYNECOLOGY

## 2020-10-14 PROCEDURE — 81002 URINALYSIS NONAUTO W/O SCOPE: CPT | Performed by: OBSTETRICS & GYNECOLOGY

## 2020-10-14 NOTE — PROGRESS NOTES
GYN Annual Exam     CC- Here for annual exam.     Lynn MATTHEWS Speaker is a 55 y.o. female who presents for annual well woman exam. Periods are absent.     OB History        1    Para   1    Term   1            AB        Living           SAB        TAB        Ectopic        Molar        Multiple        Live Births                    Current contraception: none  History of abnormal Pap smear: no  Family history of uterine, colon or ovarian cancer: no  History of abnormal mammogram: no  Family history of breast cancer: no  Last Pap : 2017 N/N    Past Medical History:   Diagnosis Date   • Allergic mild   • Anxiety    • Depression        Past Surgical History:   Procedure Laterality Date   •  SECTION     • ENDOMETRIAL ABLATION W/ NOVASURE     • REDUCTION MAMMAPLASTY Bilateral              Current Outpatient Medications:   •  desvenlafaxine (PRISTIQ) 50 MG 24 hr tablet, TAKE 1 TABLET BY MOUTH EVERY DAY, Disp: 90 tablet, Rfl: 1  •  hydrOXYzine (ATARAX) 25 MG tablet, Take 1 tablet by mouth Every 8 (Eight) Hours As Needed for Anxiety., Disp: 60 tablet, Rfl: 0  •  vitamin D (ERGOCALCIFEROL) 1.25 MG (27477 UT) capsule capsule, Take 1 capsule by mouth Every 7 (Seven) Days., Disp: 12 capsule, Rfl: 1  •  zolpidem (AMBIEN) 10 MG tablet, , Disp: , Rfl:     Allergies   Allergen Reactions   • No Known Drug Allergy        Social History     Tobacco Use   • Smoking status: Never Smoker   • Smokeless tobacco: Never Used   Substance Use Topics   • Alcohol use: Yes     Comment: occasional   • Drug use: No         Family History   Problem Relation Age of Onset   • Hypertension Father    • Hypertension Paternal Grandmother        Review of Systems   Constitutional: Negative for appetite change, fever and unexpected weight change.   HENT: Negative for congestion and sore throat.    Respiratory: Negative for cough and shortness of breath.    Cardiovascular: Negative for chest pain and palpitations.   Gastrointestinal:  "Negative for abdominal distention, abdominal pain, constipation, diarrhea, nausea and vomiting.   Endocrine: Negative.    Genitourinary: Negative for dyspareunia, menstrual problem, pelvic pain and vaginal discharge.   Skin: Negative.    Neurological: Negative for dizziness and syncope.   Hematological: Negative.    Psychiatric/Behavioral: Negative for dysphoric mood and sleep disturbance. The patient is not nervous/anxious.        /72   Ht 160 cm (62.99\")   Wt 74.3 kg (163 lb 11.2 oz)   Breastfeeding No   BMI 29.01 kg/m²     Physical Exam  Vitals signs and nursing note reviewed.   Constitutional:       Appearance: She is well-developed.   HENT:      Head: Normocephalic and atraumatic.   Neck:      Musculoskeletal: Normal range of motion and neck supple.      Thyroid: No thyromegaly.   Cardiovascular:      Rate and Rhythm: Normal rate and regular rhythm.   Pulmonary:      Effort: Pulmonary effort is normal.      Breath sounds: Normal breath sounds.   Chest:      Breasts: Breasts are symmetrical.         Right: No mass or nipple discharge.         Left: No mass or nipple discharge.   Abdominal:      General: Bowel sounds are normal. There is no distension.      Palpations: Abdomen is soft. There is no mass.      Tenderness: There is no abdominal tenderness. There is no guarding or rebound.   Genitourinary:     Exam position: Prone.      Labia:         Right: No lesion.         Left: No lesion.       Vagina: Normal.      Cervix: No cervical motion tenderness or discharge.      Uterus: Normal.       Adnexa:         Right: No mass.          Left: No mass.     Musculoskeletal: Normal range of motion.   Skin:     General: Skin is warm and dry.   Neurological:      Mental Status: She is alert and oriented to person, place, and time.   Psychiatric:         Behavior: Behavior normal.         Thought Content: Thought content normal.         Judgment: Judgment normal.         Diagnoses and all orders for this " visit:    Encounter for gynecological examination without abnormal finding    Screening for condition  -     POC Urinalysis Dipstick        Assessment     1) GYN annual well woman exam.   2) MMG normal and UTD     Plan     1) Breast Health - Clinical breast exam & mammogram yearly, Self breast awareness monthly  2) Pap - done today  3) Smoking status - Lynn MATTHEWS Speaker  reports that she has never smoked. She has never used smokeless tobacco.. I have educated her on the risk of diseases from using tobacco products such as cancer, COPD and heart disease.   4) Colon health - screening colonoscopy recommended if not up to date  5) Bone health - Weight bearing exercise, dietary calcium recommendations and vitamin D reviewed.   6) Seat belts recommended  7) Follow up prn and one year    Encounter Diagnoses   Name Primary?   • Encounter for gynecological examination without abnormal finding Yes   • Screening for condition          Gualberto Correa MD  10/14/2020  14:05 EDT

## 2020-10-19 LAB
CYTOLOGIST CVX/VAG CYTO: ABNORMAL
CYTOLOGY CVX/VAG DOC CYTO: ABNORMAL
CYTOLOGY CVX/VAG DOC THIN PREP: ABNORMAL
DX ICD CODE: ABNORMAL
HIV 1 & 2 AB SER-IMP: ABNORMAL
HPV I/H RISK 1 DNA CVX QL PROBE+SIG AMP: POSITIVE
OTHER STN SPEC: ABNORMAL
STAT OF ADQ CVX/VAG CYTO-IMP: ABNORMAL

## 2020-10-21 RX ORDER — METRONIDAZOLE 500 MG/1
500 TABLET ORAL 2 TIMES DAILY
Qty: 14 TABLET | Refills: 0 | Status: SHIPPED | OUTPATIENT
Start: 2020-10-21 | End: 2020-10-28

## 2020-11-04 ENCOUNTER — OFFICE VISIT (OUTPATIENT)
Dept: FAMILY MEDICINE CLINIC | Facility: CLINIC | Age: 55
End: 2020-11-04

## 2020-11-04 VITALS
SYSTOLIC BLOOD PRESSURE: 118 MMHG | HEIGHT: 62 IN | DIASTOLIC BLOOD PRESSURE: 78 MMHG | OXYGEN SATURATION: 97 % | RESPIRATION RATE: 16 BRPM | WEIGHT: 164 LBS | BODY MASS INDEX: 30.18 KG/M2 | TEMPERATURE: 97.6 F | HEART RATE: 103 BPM

## 2020-11-04 DIAGNOSIS — E66.9 OBESITY (BMI 30.0-34.9): ICD-10-CM

## 2020-11-04 DIAGNOSIS — E55.9 VITAMIN D DEFICIENCY: ICD-10-CM

## 2020-11-04 DIAGNOSIS — F33.1 MODERATE EPISODE OF RECURRENT MAJOR DEPRESSIVE DISORDER (HCC): Primary | ICD-10-CM

## 2020-11-04 PROCEDURE — 99213 OFFICE O/P EST LOW 20 MIN: CPT | Performed by: PHYSICIAN ASSISTANT

## 2020-11-04 RX ORDER — DESVENLAFAXINE 100 MG/1
100 TABLET, EXTENDED RELEASE ORAL DAILY
Qty: 30 TABLET | Refills: 0 | Status: SHIPPED | OUTPATIENT
Start: 2020-11-04 | End: 2020-12-02 | Stop reason: SDUPTHER

## 2020-11-04 RX ORDER — ERGOCALCIFEROL 1.25 MG/1
50000 CAPSULE ORAL
Qty: 12 CAPSULE | Refills: 1 | Status: SHIPPED | OUTPATIENT
Start: 2020-11-04 | End: 2021-05-24

## 2020-11-04 NOTE — PROGRESS NOTES
"Subjective   Lynn R Speaker is a 55 y.o. female presents for   Chief Complaint   Patient presents with   • Depression     management       History of Present Illness     Lynn is a 55-year-old female who presents for depression management.  States she has been taking her Pristiq 50 mg daily.  She has noticed that she has had increased depression.  Has noticed decreased desire to do things and has had sleep interruption.  States overall work is going well.  She has been very busy.  She does not know why she has had increased depression symptoms because everything should be fine.  States she just feels sad for no reason.  Denied any suicidal or homicidal ideation.  Has good family support.  Appetite has been normal for her.  Physical activity is work related.  States she has not been on her Ambien since February 2020.  She has hydroxyzine that she takes occasionally for panic attacks but does not take routinely.  She refused any updated immunizations today.  She has been on citalopram and Wellbutrin in past.  States she did the best with the Pristiq medication.  She has had no side effects with the medication.    The following portions of the patient's history were reviewed and updated as appropriate: allergies, current medications, past family history, past medical history, past social history, past surgical history and problem list.    Review of Systems   All other systems reviewed and are negative.        Vitals:    11/04/20 1346   BP: 118/78   Pulse: 103   Resp: 16   Temp: 97.6 °F (36.4 °C)   SpO2: 97%   Weight: 74.4 kg (164 lb)   Height: 157.5 cm (62\")     Wt Readings from Last 3 Encounters:   11/04/20 74.4 kg (164 lb)   10/14/20 74.3 kg (163 lb 11.2 oz)   06/01/20 74.8 kg (165 lb)     BP Readings from Last 3 Encounters:   11/04/20 118/78   10/14/20 112/72   03/13/20 138/88     Social History     Socioeconomic History   • Marital status: Single     Spouse name: Not on file   • Number of children: Not on file "   • Years of education: Not on file   • Highest education level: Not on file   Tobacco Use   • Smoking status: Never Smoker   • Smokeless tobacco: Never Used   Substance and Sexual Activity   • Alcohol use: Yes     Comment: occasional   • Drug use: No   • Sexual activity: Never       Allergies   Allergen Reactions   • No Known Drug Allergy        Body mass index is 30 kg/m².    Objective   Physical Exam  Constitutional:       Appearance: Normal appearance. She is well-developed and well-groomed. She is obese.      Interventions: Face mask in place.   HENT:      Head: Normocephalic and atraumatic.   Neck:      Musculoskeletal: Neck supple.      Thyroid: No thyroid mass, thyromegaly or thyroid tenderness.      Trachea: Trachea and phonation normal. No tracheal tenderness.   Cardiovascular:      Rate and Rhythm: Normal rate and regular rhythm.      Pulses: Normal pulses.      Heart sounds: Normal heart sounds, S1 normal and S2 normal. No murmur.   Pulmonary:      Effort: Pulmonary effort is normal.      Breath sounds: Normal breath sounds and air entry.   Abdominal:      General: Bowel sounds are normal.      Palpations: Abdomen is soft. There is no hepatomegaly.      Tenderness: There is no abdominal tenderness.   Musculoskeletal:      Right lower leg: No edema.      Left lower leg: No edema.   Skin:     General: Skin is warm and dry.      Capillary Refill: Capillary refill takes less than 2 seconds.   Neurological:      Mental Status: She is alert and oriented to person, place, and time.   Psychiatric:         Attention and Perception: Attention and perception normal.         Mood and Affect: Affect normal. Mood is depressed.         Speech: Speech normal.         Behavior: Behavior normal. Behavior is cooperative.         Thought Content: Thought content normal.         Cognition and Memory: Cognition and memory normal.         Judgment: Judgment normal.      I was wearing surgical mask during the entire office  visit encounter.      Assessment/Plan   Diagnoses and all orders for this visit:    1. Moderate episode of recurrent major depressive disorder (CMS/HCC) (Primary)  -     desvenlafaxine (Pristiq) 100 MG 24 hr tablet; Take 1 tablet by mouth Daily.  Dispense: 30 tablet; Refill: 0    2. Vitamin D deficiency  -     vitamin D (ERGOCALCIFEROL) 1.25 MG (01836 UT) capsule capsule; Take 1 capsule by mouth Every 7 (Seven) Days.  Dispense: 12 capsule; Refill: 1    3. Obesity (BMI 30.0-34.9)      1.  Chronic and uncontrolled major depressive disorder: I will increase her Pristiq to 100 mg.  She has done well with her Pristiq medication.  I am hoping that with the increased dose she will see improvement.  If no improvement will need to add possible Rexulti to her medication.  Lynn will return to office in 1 month for reevaluation.  2.  New obesity: Have been encouraged her to lose weight by dieting and exercising.  Will reevaluate in 1 month.  3.  Chronic vitamin D deficiency: She has been out of her vitamin D.  Have sent a refill to pharmacy.  We will recheck vitamin D level in the next 3-6 months.    [facility]  [address] [place] [id number]  Dept: [phone]  Dept Fax: [phone]  Loc: [phone]  Loc Fax: [phone]

## 2020-11-04 NOTE — PATIENT INSTRUCTIONS
Mediterranean Diet  A Mediterranean diet refers to food and lifestyle choices that are based on the traditions of countries located on the [place]. This way of eating has been shown to help prevent certain conditions and improve outcomes for people who have chronic diseases, like kidney disease and heart disease.  What are tips for following this plan?  Lifestyle  · Cook and eat meals together with your family, when possible.  · Drink enough fluid to keep your urine clear or pale yellow.  · Be physically active every day. This includes:  ? Aerobic exercise like running or swimming.  ? Leisure activities like gardening, walking, or housework.  · Get 7-8 hours of sleep each night.  · If recommended by your health care provider, drink red wine in moderation. This means 1 glass a day for nonpregnant women and 2 glasses a day for men. A glass of wine equals 5 oz (150 mL).  Reading food labels    · Check the serving size of packaged foods. For foods such as rice and pasta, the serving size refers to the amount of cooked product, not dry.  · Check the total fat in packaged foods. Avoid foods that have saturated fat or trans fats.  · Check the ingredients list for added sugars, such as corn syrup.  Shopping  · At the grocery store, buy most of your food from the areas near the walls of the store. This includes:  ? Fresh fruits and vegetables (produce).  ? Grains, beans, nuts, and seeds. Some of these may be available in unpackaged forms or large amounts (in bulk).  ? Fresh seafood.  ? Poultry and eggs.  ? Low-fat dairy products.  · Buy whole ingredients instead of prepackaged foods.  · Buy fresh fruits and vegetables in-season from local farmers markets.  · Buy frozen fruits and vegetables in resealable bags.  · If you do not have access to quality fresh seafood, buy precooked frozen shrimp or canned fish, such as tuna, salmon, or sardines.  · Buy small amounts of raw or cooked vegetables, salads, or olives from  the deli or salad bar at your store.  · Stock your pantry so you always have certain foods on hand, such as olive oil, canned tuna, canned tomatoes, rice, pasta, and beans.  Cooking  · Cook foods with extra-virgin olive oil instead of using butter or other vegetable oils.  · Have meat as a side dish, and have vegetables or grains as your main dish. This means having meat in small portions or adding small amounts of meat to foods like pasta or stew.  · Use beans or vegetables instead of meat in common dishes like chili or lasagna.  · [place] with different cooking methods. Try roasting or broiling vegetables instead of steaming or sautéeing them.  · Add frozen vegetables to soups, stews, pasta, or rice.  · Add nuts or seeds for added healthy fat at each meal. You can add these to yogurt, salads, or vegetable dishes.  · Marinate fish or vegetables using olive oil, lemon juice, garlic, and fresh herbs.  Meal planning    · Plan to eat 1 vegetarian meal one day each week. Try to work up to 2 vegetarian meals, if possible.  · Eat seafood 2 or more times a week.  · Have healthy snacks readily available, such as:  ? Vegetable sticks with hummus.  ? Greek yogurt.  ? Fruit and nut trail mix.  · Eat balanced meals throughout the week. This includes:  ? Fruit: 2-3 servings a day  ? Vegetables: 4-5 servings a day  ? Low-fat dairy: 2 servings a day  ? Fish, poultry, or lean meat: 1 serving a day  ? Beans and legumes: 2 or more servings a week  ? Nuts and seeds: 1-2 servings a day  ? Whole grains: 6-8 servings a day  ? Extra-virgin olive oil: 3-4 servings a day  · Limit red meat and sweets to only a few servings a month  What are my food choices?  · Mediterranean diet  ? Recommended  § Grains: Whole-grain pasta. Brown rice. Bulgar wheat. Polenta. Couscous. Whole-wheat bread. Oatmeal. Quinoa.  § Vegetables: Artichokes. Beets. Broccoli. Cabbage. Carrots. Eggplant. Green beans. Chard. Kale. Spinach. Onions. Leeks. Peas. Squash.  Tomatoes. Peppers. Radishes.  § Fruits: Apples. Apricots. Avocado. Berries. Bananas. Cherries. Dates. Figs. Grapes. Felipe. Melon. Oranges. Peaches. Plums. Pomegranate.  § Meats and other protein foods: Beans. Almonds. Sunflower seeds. Pine nuts. Peanuts. Cod. [place]. Scallops. Shrimp. Tuna. Tilapia. Clams. Oysters. Eggs.  § Dairy: Low-fat milk. Cheese. Greek yogurt.  § Beverages: Water. Red wine. Herbal tea.  § Fats and oils: Extra virgin olive oil. Avocado oil. Grape seed oil.  § Sweets and desserts: Greek yogurt with honey. Baked apples. Poached pears. Trail mix.  § Seasoning and other foods: Basil. Cilantro. Coriander. Cumin. Mint. Parsley. Junior. Rosemary. Tarragon. Garlic. Oregano. Thyme. Pepper. Balsalmic vinegar. Tahini. Hummus. Tomato sauce. Olives. Mushrooms.  ? Limit these  § Grains: Prepackaged pasta or rice dishes. Prepackaged cereal with added sugar.  § Vegetables: Deep fried potatoes (french fries).  § Fruits: Fruit canned in syrup.  § Meats and other protein foods: Beef. Pork. Lamb. Poultry with skin. Hot dogs. Rhodes.  § Dairy: Ice cream. Sour cream. Whole milk.  § Beverages: Juice. Sugar-sweetened soft drinks. Beer. Liquor and spirits.  § Fats and oils: Butter. Canola oil. Vegetable oil. Beef fat (tallow). Lard.  § Sweets and desserts: Cookies. Cakes. Pies. Candy.  § Seasoning and other foods: Mayonnaise. Premade sauces and marinades.  The items listed may not be a complete list. Talk with your dietitian about what dietary choices are right for you.  Summary  · The Mediterranean diet includes both food and lifestyle choices.  · Eat a variety of fresh fruits and vegetables, beans, nuts, seeds, and whole grains.  · Limit the amount of red meat and sweets that you eat.  · Talk with your health care provider about whether it is safe for you to drink red wine in moderation. This means 1 glass a day for nonpregnant women and 2 glasses a day for men. A glass of wine equals 5 oz (150 mL).  This information  is not intended to replace advice given to you by your health care provider. Make sure you discuss any questions you have with your health care provider.  Document Released: 08/10/2017 Document Revised: 08/17/2017 Document Reviewed: 08/10/2017  Elsevier Patient Education © 2020 [employer]

## 2020-12-02 ENCOUNTER — TELEPHONE (OUTPATIENT)
Dept: FAMILY MEDICINE CLINIC | Facility: CLINIC | Age: 55
End: 2020-12-02

## 2020-12-02 DIAGNOSIS — F33.1 MODERATE EPISODE OF RECURRENT MAJOR DEPRESSIVE DISORDER (HCC): ICD-10-CM

## 2020-12-02 RX ORDER — DESVENLAFAXINE 100 MG/1
100 TABLET, EXTENDED RELEASE ORAL DAILY
Qty: 90 TABLET | Refills: 0 | Status: SHIPPED | OUTPATIENT
Start: 2020-12-02 | End: 2020-12-21 | Stop reason: SDUPTHER

## 2020-12-02 NOTE — TELEPHONE ENCOUNTER
Pt calling, states she thinks her appt for tomorrow isn't necessary. Pt states her medication is working well, and stated she only needs bloodwork.  Please advise.

## 2020-12-02 NOTE — TELEPHONE ENCOUNTER
If she is doing fine with medication and has no suicidal or homicidal thoughts may cancel her appointment tomorrow.  Will need to be seen in 3 months.

## 2020-12-19 DIAGNOSIS — F33.1 MODERATE EPISODE OF RECURRENT MAJOR DEPRESSIVE DISORDER (HCC): ICD-10-CM

## 2020-12-21 DIAGNOSIS — F33.1 MODERATE EPISODE OF RECURRENT MAJOR DEPRESSIVE DISORDER (HCC): ICD-10-CM

## 2020-12-21 RX ORDER — DESVENLAFAXINE SUCCINATE 50 MG/1
TABLET, EXTENDED RELEASE ORAL
Qty: 90 TABLET | Refills: 1 | OUTPATIENT
Start: 2020-12-21

## 2020-12-21 RX ORDER — DESVENLAFAXINE 100 MG/1
100 TABLET, EXTENDED RELEASE ORAL DAILY
Qty: 90 TABLET | Refills: 0 | Status: SHIPPED | OUTPATIENT
Start: 2020-12-21 | End: 2021-03-22

## 2021-01-01 NOTE — TELEPHONE ENCOUNTER
Call pt with following message.    Per Dr. Robert,  Can we get her follow up with her pcp sometime next week and ensure she was able to  her medication?    Pt has been informed and she stated that she will not be scheduling a f/u appointment if she feels fine.   no anomalies noted

## 2021-03-12 DIAGNOSIS — E55.9 VITAMIN D INSUFFICIENCY: ICD-10-CM

## 2021-03-12 DIAGNOSIS — R53.83 FATIGUE, UNSPECIFIED TYPE: ICD-10-CM

## 2021-03-12 DIAGNOSIS — E78.2 MIXED HYPERLIPIDEMIA: Primary | ICD-10-CM

## 2021-03-12 DIAGNOSIS — R73.9 HYPERGLYCEMIA: ICD-10-CM

## 2021-03-22 ENCOUNTER — OFFICE VISIT (OUTPATIENT)
Dept: FAMILY MEDICINE CLINIC | Facility: CLINIC | Age: 56
End: 2021-03-22

## 2021-03-22 VITALS
OXYGEN SATURATION: 96 % | WEIGHT: 169 LBS | TEMPERATURE: 97.5 F | HEIGHT: 62 IN | SYSTOLIC BLOOD PRESSURE: 122 MMHG | DIASTOLIC BLOOD PRESSURE: 80 MMHG | BODY MASS INDEX: 31.1 KG/M2 | HEART RATE: 77 BPM

## 2021-03-22 DIAGNOSIS — F51.01 PRIMARY INSOMNIA: ICD-10-CM

## 2021-03-22 DIAGNOSIS — F33.1 MODERATE EPISODE OF RECURRENT MAJOR DEPRESSIVE DISORDER (HCC): ICD-10-CM

## 2021-03-22 DIAGNOSIS — Z00.00 ENCOUNTER FOR ANNUAL PHYSICAL EXAM: Primary | ICD-10-CM

## 2021-03-22 DIAGNOSIS — E55.9 VITAMIN D INSUFFICIENCY: ICD-10-CM

## 2021-03-22 PROCEDURE — 99396 PREV VISIT EST AGE 40-64: CPT | Performed by: PHYSICIAN ASSISTANT

## 2021-03-22 RX ORDER — TRAZODONE HYDROCHLORIDE 50 MG/1
50 TABLET ORAL NIGHTLY
Qty: 30 TABLET | Refills: 0 | Status: SHIPPED | OUTPATIENT
Start: 2021-03-22 | End: 2021-04-14 | Stop reason: SDUPTHER

## 2021-03-22 NOTE — PROGRESS NOTES
"Chief Complaint  Annual Exam and Depression    Subjective          Lynn MATTHEWS Speaker presents to [facility] PRIMARY [facility]ARE  History of Present Illness  Lynn is a 55-year-old female who presents for annual physical examination with depression management.  States she has been taking the Pristiq for the past few years and is no longer helping with her depression symptoms.  She has been on Wellbutrin and citalopram in the past but got used to it as well.  States she feels overwhelmed at times and cannot shut her brain down.  It is starting to affect her sleep again.  She has been using hydroxyzine without relief of insomnia issues.  She has been on Lunesta and Sonata in past without relief of sleep.  Did take the Ambien medication for a few years but does not want to try this again.  She has been using melatonin recently without relief.  Denied any suicidal or homicidal ideation.  Bowel movements have been daily without dark black tarry stools.  States she had the Covid infection in October 2020.  Denied any fevers, chills, upper respiratory symptoms, chest pain, shortness of air, wheezing, dizziness, headache, abdominal pain, GI upset, swelling of ankles or urinary symptoms.  Diet has been slightly healthy.     Objective   Vital Signs:   /80   Pulse 77   Temp 97.5 °F (36.4 °C)   Ht 157.5 cm (62\")   Wt 76.7 kg (169 lb)   SpO2 96%   BMI 30.91 kg/m²     Physical Exam  Vitals and nursing note reviewed.   Constitutional:       Appearance: Normal appearance. She is well-developed and well-groomed. She is obese.      Interventions: Face mask in place.   HENT:      Head: Normocephalic and atraumatic.      Jaw: There is normal jaw occlusion.      Right Ear: Hearing, tympanic membrane, ear canal and external ear normal.      Left Ear: Hearing, tympanic membrane, ear canal and external ear normal.      Nose: Nose normal.      Mouth/Throat:      Lips: Pink.      Mouth: Mucous membranes are moist.      " Dentition: Normal dentition.      Tongue: No lesions.      Pharynx: Oropharynx is clear. Uvula midline.      Tonsils: No tonsillar exudate.   Eyes:      General: Lids are normal.      Conjunctiva/sclera: Conjunctivae normal.      Pupils: Pupils are equal, round, and reactive to light.   Neck:      Thyroid: No thyroid mass, thyromegaly or thyroid tenderness.      Vascular: No carotid bruit.      Trachea: Trachea and phonation normal. No tracheal tenderness.   Cardiovascular:      Rate and Rhythm: Normal rate and regular rhythm.      Pulses: Normal pulses.      Heart sounds: Normal heart sounds, S1 normal and S2 normal. No murmur heard.     Pulmonary:      Effort: Pulmonary effort is normal.      Breath sounds: Normal breath sounds and air entry.   Abdominal:      General: Bowel sounds are normal.      Palpations: Abdomen is soft.      Tenderness: There is no abdominal tenderness.   Musculoskeletal:      Cervical back: Neck supple.      Right lower leg: No edema.      Left lower leg: No edema.   Lymphadenopathy:      Cervical: No cervical adenopathy.      Right cervical: No superficial, deep or posterior cervical adenopathy.     Left cervical: No superficial, deep or posterior cervical adenopathy.   Skin:     General: Skin is warm and dry.      Capillary Refill: Capillary refill takes less than 2 seconds.   Neurological:      Mental Status: She is alert and oriented to person, place, and time.      Deep Tendon Reflexes:      Reflex Scores:       Patellar reflexes are 2+ on the right side and 2+ on the left side.  Psychiatric:         Attention and Perception: Attention and perception normal.         Mood and Affect: Affect normal. Mood is depressed.         Speech: Speech normal.         Behavior: Behavior is cooperative.         Thought Content: Thought content normal.         Cognition and Memory: Cognition and memory normal.         Judgment: Judgment normal.     I was wearing a surgical mask and face shield during  the entire office visit/encounter.     Result Review :                 Assessment and Plan    Diagnoses and all orders for this visit:    1. Encounter for annual physical exam (Primary)  -     TSH    2. Primary insomnia  -     traZODone (DESYREL) 50 MG tablet; Take 1 tablet by mouth Every Night.  Dispense: 30 tablet; Refill: 0    3. Moderate episode of recurrent major depressive disorder (CMS/HCC)  -     TSH  -     traZODone (DESYREL) 50 MG tablet; Take 1 tablet by mouth Every Night.  Dispense: 30 tablet; Refill: 0    4. Vitamin D insufficiency      1.  Annual physical examination with vitamin D deficiency: Lynn is non fasting today.  She will return to office for CBC, CMP, lipid profile, TSH and a vitamin D level.  She will be notified of test results when completed.  2.  Chronic primary insomnia: She is still having trouble sleeping.  She has been on Lunesta, Sonata, Ambien, melatonin and hydroxyzine without relief.  Will try trazodone 50 mg 1 hour before bedtime.  She will return to office in 1 month for reevaluation.  3.  Chronic uncontrolled major depressive disorder: She would like to wean off of the Pristiq medication.  States is no longer helping with her symptoms.  She was instructed to use her 50 mg of Pristiq at home once daily for the next week.  She may then stop the medication.  Meanwhile she will start the Wellbutrin 50 mg nightly.  Radha will return to office in 1 month for reevaluation.  Patient Counseling:  --Nutrition: Stressed importance of moderation in sodium/caffeine intake, saturated fat and cholesterol.  Discussed caloric balance, sufficient intake of fresh fruits, vegetables, fiber,   calcium, iron.  --Discussed the new recommendation against daily use of baby aspirin for primary prevention in low risk patients.  --Exercise: Stressed the importance of regular exercise by incorporating into daily routine.    --Substance Abuse: Discussed cessation/primary prevention of tobacco, alcohol, or  other drug use; driving or other dangerous activities under the influence.    --Dental health: Discussed importance of regular tooth brushing, flossing, and dental visits.  -- Suggested having eyes and vision checked if needed or past due.  --Immunizations reviewed.  --Discussed benefits of screening colonoscopy.        Follow Up   Return in about 4 weeks (around 4/19/2021).  Patient was given instructions and counseling regarding her condition or for health maintenance advice. Please see specific information pulled into the AVS if appropriate.     TRA Munoz [facility]  [address] [place] [address]  Dept: [phone]  Dept Fax: [phone]  Loc: [phone]  Loc Fax: [phone]

## 2021-03-23 ENCOUNTER — TELEPHONE (OUTPATIENT)
Dept: FAMILY MEDICINE CLINIC | Facility: CLINIC | Age: 56
End: 2021-03-23

## 2021-04-14 DIAGNOSIS — F51.01 PRIMARY INSOMNIA: ICD-10-CM

## 2021-04-14 DIAGNOSIS — F33.1 MODERATE EPISODE OF RECURRENT MAJOR DEPRESSIVE DISORDER (HCC): ICD-10-CM

## 2021-04-14 RX ORDER — TRAZODONE HYDROCHLORIDE 50 MG/1
50 TABLET ORAL NIGHTLY
Qty: 30 TABLET | Refills: 0 | Status: SHIPPED | OUTPATIENT
Start: 2021-04-14 | End: 2021-04-26

## 2021-04-26 ENCOUNTER — OFFICE VISIT (OUTPATIENT)
Dept: FAMILY MEDICINE CLINIC | Facility: CLINIC | Age: 56
End: 2021-04-26

## 2021-04-26 VITALS
HEART RATE: 85 BPM | WEIGHT: 169 LBS | HEIGHT: 62 IN | BODY MASS INDEX: 31.1 KG/M2 | SYSTOLIC BLOOD PRESSURE: 118 MMHG | TEMPERATURE: 98 F | OXYGEN SATURATION: 96 % | DIASTOLIC BLOOD PRESSURE: 76 MMHG

## 2021-04-26 DIAGNOSIS — F41.1 GAD (GENERALIZED ANXIETY DISORDER): ICD-10-CM

## 2021-04-26 DIAGNOSIS — Z79.899 HIGH RISK MEDICATION USE: ICD-10-CM

## 2021-04-26 DIAGNOSIS — F51.01 PRIMARY INSOMNIA: Primary | ICD-10-CM

## 2021-04-26 DIAGNOSIS — B35.1 ONYCHOMYCOSIS: ICD-10-CM

## 2021-04-26 DIAGNOSIS — F33.1 MODERATE EPISODE OF RECURRENT MAJOR DEPRESSIVE DISORDER (HCC): ICD-10-CM

## 2021-04-26 PROCEDURE — 99214 OFFICE O/P EST MOD 30 MIN: CPT | Performed by: PHYSICIAN ASSISTANT

## 2021-04-26 RX ORDER — DESVENLAFAXINE 100 MG/1
100 TABLET, EXTENDED RELEASE ORAL DAILY
Qty: 30 TABLET | Refills: 3 | Status: SHIPPED | OUTPATIENT
Start: 2021-04-26 | End: 2021-05-04

## 2021-04-26 RX ORDER — ALPRAZOLAM 0.25 MG/1
0.25 TABLET ORAL NIGHTLY PRN
Qty: 30 TABLET | Refills: 0 | Status: SHIPPED | OUTPATIENT
Start: 2021-04-26 | End: 2021-05-26

## 2021-04-26 RX ORDER — DESVENLAFAXINE 100 MG/1
100 TABLET, EXTENDED RELEASE ORAL DAILY
COMMUNITY
End: 2021-04-26

## 2021-04-26 RX ORDER — TERBINAFINE HYDROCHLORIDE 250 MG/1
250 TABLET ORAL DAILY
Qty: 45 TABLET | Refills: 0 | Status: SHIPPED | OUTPATIENT
Start: 2021-04-26 | End: 2022-02-23

## 2021-04-26 NOTE — PROGRESS NOTES
"Chief Complaint  Depression (would like to go back on pristiq) and Insomnia (xanax)    Subjective          Lynn MATTHEWS Speaker presents to [facility] PR[facility]  History of Present Illness  Lynn is a 56-year-old female who presents for depression, anxiety and insomnia management.  States she was taking trazodone 50 mg nightly but had to stop.  States it caused increased sedation the next day.  States she felt really tired on the medication.  She has tried melatonin and other over-the-counter medications for insomnia in the past.  Has also tried Ambien.  States the Ambien made her get up in the middle of the night and eat and do other things.  States she has tried Xanax in the past which helped.  States she has a hard time shutting her brain down at night due to thinking about things.  She has been taking the Pristiq medication daily.  This is helped somewhat with her symptoms.  Denied any snoring.  Appetite has been normal.     Objective   Vital Signs:   /76   Pulse 85   Temp 98 °F (36.7 °C)   Ht 157.5 cm (62\")   Wt 76.7 kg (169 lb)   SpO2 96%   BMI 30.91 kg/m²     Physical Exam  Vitals and nursing note reviewed.   Constitutional:       Appearance: Normal appearance. She is well-developed and well-groomed. She is obese.      Interventions: Face mask in place.   HENT:      Head: Normocephalic and atraumatic.   Neck:      Trachea: Trachea and phonation normal. No tracheal tenderness.   Cardiovascular:      Rate and Rhythm: Normal rate and regular rhythm.      Pulses: Normal pulses.      Heart sounds: Normal heart sounds, S1 normal and S2 normal. No murmur heard.     Pulmonary:      Effort: Pulmonary effort is normal.      Breath sounds: Normal breath sounds and air entry.   Abdominal:      General: Bowel sounds are normal.      Palpations: Abdomen is soft. There is no hepatomegaly.      Tenderness: There is no abdominal tenderness.   Musculoskeletal:      Cervical back: Neck supple. "   Skin:     General: Skin is warm and dry.      Capillary Refill: Capillary refill takes less than 2 seconds.          Neurological:      Mental Status: She is alert and oriented to person, place, and time.   Psychiatric:         Attention and Perception: Attention and perception normal.         Mood and Affect: Affect normal. Mood is anxious.         Speech: Speech normal.         Behavior: Behavior normal. Behavior is cooperative.         Thought Content: Thought content normal.         Cognition and Memory: Cognition and memory normal.         Judgment: Judgment normal.     I was wearing a surgical mask and face shield during the entire office visit/encounter.     Result Review :                 Assessment and Plan    Diagnoses and all orders for this visit:    1. Primary insomnia (Primary)  -     Compliance Drug Analysis, Ur - Urine, Clean Catch  -     ALPRAZolam (Xanax) 0.25 MG tablet; Take 1 tablet by mouth At Night As Needed for Anxiety or Sleep.  Dispense: 30 tablet; Refill: 0    2. High risk medication use  -     Compliance Drug Analysis, Ur - Urine, Clean Catch  -     terbinafine (LamISIL) 250 MG tablet; Take 1 tablet by mouth Daily. Take for 6 weeks for nail infection  Dispense: 45 tablet; Refill: 0    3. Onychomycosis  -     terbinafine (LamISIL) 250 MG tablet; Take 1 tablet by mouth Daily. Take for 6 weeks for nail infection  Dispense: 45 tablet; Refill: 0    4. TRISTIN (generalized anxiety disorder)  -     ALPRAZolam (Xanax) 0.25 MG tablet; Take 1 tablet by mouth At Night As Needed for Anxiety or Sleep.  Dispense: 30 tablet; Refill: 0  -     desvenlafaxine (PRISTIQ) 100 MG 24 hr tablet; Take 1 tablet by mouth Daily.  Dispense: 30 tablet; Refill: 3    5. Moderate episode of recurrent major depressive disorder (CMS/HCC)  -     desvenlafaxine (PRISTIQ) 100 MG 24 hr tablet; Take 1 tablet by mouth Daily.  Dispense: 30 tablet; Refill: 3    1.  Chronic primary insomnia with generalized anxiety disorder: I will  try Xanax 0.25 nightly as needed for her sleep and anxiety issues.  She will schedule follow-up appointment in 1 month for reevaluation.  Lynn will have a urine drug screen collected for baseline testing today.  She has signed the appropriate agreement and consent forms for the medication.  See below for Jorge information.  She will continue her Pristiq medication which I have sent to pharmacy.  Plan to follow-up in 1 month..  2.  New Onychomycosis of fingernail: I will try Lamisil once daily.  I have sent prescription to pharmacy.  She will keep the nail trimmed.  Will reevaluate at next office visit.  We will need to recheck liver function test in 6 weeks.    As part of this patient's treatment plan I am prescribing controlled substances.  The patient has been made aware of appropriate use of such medications, including potential risk of somnolence. Limited ability to drive and/or work safely, and potential for dependence or overdose.  It has also been made clear that these medications are for use by this patient only, without concomitant use of alcohol or other substances unless prescribed.  JORGE report has been reviewed and scanned into the patient's chart.  Jorge number is [id number] dated April 24, 2021    Follow Up   Return in about 4 weeks (around 5/24/2021).  Patient was given instructions and counseling regarding her condition or for health maintenance advice. Please see specific information pulled into the AVS if appropriate.     TRA Munoz [facility]  [address] [place] [id number]  Dept: [phone]  Dept Fax: [phone]  Loc: [phone]  Loc Fax: [phone]

## 2021-04-27 NOTE — PROGRESS NOTES
I, Dr. Tomás Hartmann, have reviewed the notes, assessments, and/or procedures performed by Anabella BALBUENA, that occurred within our practice at [facility] location, I concur with her documentation of Lynn Gomez. I have a current collaborative medical agreement with Anabella BALBUENA.

## 2021-04-30 LAB — DRUGS UR: NORMAL

## 2021-05-04 DIAGNOSIS — F33.1 MODERATE EPISODE OF RECURRENT MAJOR DEPRESSIVE DISORDER (HCC): ICD-10-CM

## 2021-05-04 DIAGNOSIS — F41.1 GAD (GENERALIZED ANXIETY DISORDER): ICD-10-CM

## 2021-05-04 RX ORDER — DESVENLAFAXINE 100 MG/1
TABLET, EXTENDED RELEASE ORAL
Qty: 90 TABLET | Refills: 0 | Status: SHIPPED | OUTPATIENT
Start: 2021-05-04 | End: 2021-08-16

## 2021-05-18 LAB
25(OH)D3+25(OH)D2 SERPL-MCNC: 35.7 NG/ML (ref 30–100)
ALBUMIN SERPL-MCNC: 4.6 G/DL (ref 3.5–5.2)
ALBUMIN/GLOB SERPL: 2 G/DL
ALP SERPL-CCNC: 88 U/L (ref 39–117)
ALT SERPL-CCNC: 28 U/L (ref 1–33)
AST SERPL-CCNC: 22 U/L (ref 1–32)
BASOPHILS # BLD AUTO: ABNORMAL 10*3/UL
BILIRUB SERPL-MCNC: 0.3 MG/DL (ref 0–1.2)
BUN SERPL-MCNC: 14 MG/DL (ref 6–20)
BUN/CREAT SERPL: 16.7 (ref 7–25)
CALCIUM SERPL-MCNC: 10 MG/DL (ref 8.6–10.5)
CHLORIDE SERPL-SCNC: 104 MMOL/L (ref 98–107)
CHOLEST SERPL-MCNC: 227 MG/DL (ref 0–200)
CO2 SERPL-SCNC: 27.3 MMOL/L (ref 22–29)
CREAT SERPL-MCNC: 0.84 MG/DL (ref 0.57–1)
DIFFERENTIAL COMMENT: NORMAL
EOSINOPHIL # BLD AUTO: ABNORMAL 10*3/UL
EOSINOPHIL NFR BLD AUTO: ABNORMAL %
ERYTHROCYTE [DISTWIDTH] IN BLOOD BY AUTOMATED COUNT: 13 % (ref 12.3–15.4)
GLOBULIN SER CALC-MCNC: 2.3 GM/DL
GLUCOSE SERPL-MCNC: 96 MG/DL (ref 65–99)
HCT VFR BLD AUTO: 41.3 % (ref 34–46.6)
HDLC SERPL-MCNC: 56 MG/DL (ref 40–60)
HGB BLD-MCNC: 14.3 G/DL (ref 12–15.9)
LDLC SERPL CALC-MCNC: 132 MG/DL (ref 0–100)
LDLC/HDLC SERPL: 2.28 {RATIO}
LYMPHOCYTES # BLD AUTO: ABNORMAL 10*3/UL
LYMPHOCYTES # BLD MANUAL: 2.09 10*3/MM3 (ref 0.7–3.1)
LYMPHOCYTES NFR BLD AUTO: ABNORMAL %
LYMPHOCYTES NFR BLD MANUAL: 42 % (ref 19.6–45.3)
MCH RBC QN AUTO: 36.5 PG (ref 26.6–33)
MCHC RBC AUTO-ENTMCNC: 34.6 G/DL (ref 31.5–35.7)
MCV RBC AUTO: 105.4 FL (ref 79–97)
MONOCYTES # BLD MANUAL: 0.3 10*3/MM3 (ref 0.1–0.9)
MONOCYTES NFR BLD AUTO: ABNORMAL %
MONOCYTES NFR BLD MANUAL: 6 % (ref 5–12)
NEUTROPHILS # BLD MANUAL: 2.59 10*3/MM3 (ref 1.7–7)
NEUTROPHILS NFR BLD AUTO: ABNORMAL %
NEUTROPHILS NFR BLD MANUAL: 52 % (ref 42.7–76)
PLATELET # BLD AUTO: 177 10*3/MM3 (ref 140–450)
PLATELET BLD QL SMEAR: NORMAL
POTASSIUM SERPL-SCNC: 4.1 MMOL/L (ref 3.5–5.2)
PROT SERPL-MCNC: 6.9 G/DL (ref 6–8.5)
RBC # BLD AUTO: 3.92 10*6/MM3 (ref 3.77–5.28)
RBC MORPH BLD: NORMAL
SODIUM SERPL-SCNC: 140 MMOL/L (ref 136–145)
TRIGL SERPL-MCNC: 218 MG/DL (ref 0–150)
TSH SERPL DL<=0.005 MIU/L-ACNC: 2.88 UIU/ML (ref 0.27–4.2)
VLDLC SERPL CALC-MCNC: 39 MG/DL (ref 5–40)
WBC # BLD AUTO: 4.98 10*3/MM3 (ref 3.4–10.8)

## 2021-05-23 DIAGNOSIS — E55.9 VITAMIN D DEFICIENCY: ICD-10-CM

## 2021-05-24 RX ORDER — ERGOCALCIFEROL 1.25 MG/1
50000 CAPSULE ORAL
Qty: 12 CAPSULE | Refills: 1 | Status: SHIPPED | OUTPATIENT
Start: 2021-05-24 | End: 2021-11-10

## 2021-05-26 ENCOUNTER — OFFICE VISIT (OUTPATIENT)
Dept: FAMILY MEDICINE CLINIC | Facility: CLINIC | Age: 56
End: 2021-05-26

## 2021-05-26 VITALS
TEMPERATURE: 97.2 F | BODY MASS INDEX: 31.65 KG/M2 | OXYGEN SATURATION: 99 % | DIASTOLIC BLOOD PRESSURE: 90 MMHG | HEART RATE: 84 BPM | SYSTOLIC BLOOD PRESSURE: 138 MMHG | HEIGHT: 62 IN | WEIGHT: 172 LBS

## 2021-05-26 DIAGNOSIS — F33.1 MODERATE EPISODE OF RECURRENT MAJOR DEPRESSIVE DISORDER (HCC): Primary | ICD-10-CM

## 2021-05-26 DIAGNOSIS — E78.2 MIXED HYPERLIPIDEMIA: ICD-10-CM

## 2021-05-26 DIAGNOSIS — F41.1 GAD (GENERALIZED ANXIETY DISORDER): ICD-10-CM

## 2021-05-26 DIAGNOSIS — F51.01 PRIMARY INSOMNIA: ICD-10-CM

## 2021-05-26 PROCEDURE — 99214 OFFICE O/P EST MOD 30 MIN: CPT | Performed by: PHYSICIAN ASSISTANT

## 2021-05-26 RX ORDER — ALPRAZOLAM 0.25 MG/1
0.25 TABLET ORAL NIGHTLY PRN
Qty: 30 TABLET | Refills: 0 | Status: SHIPPED | OUTPATIENT
Start: 2021-05-26 | End: 2021-07-26

## 2021-05-26 NOTE — PROGRESS NOTES
"Chief Complaint  Anxiety and Insomnia    Subjective          Lynn MATTHEWS Speaker presents to [facility] PR[facility]  History of Present Illness  Lynn is a 56 year old female who presents for hyperlipidemia, anxiety, depression and insomnia management.  States the Xanax has helped with her anxiety and sleeping issues.  She has been taking her Pristiq medication daily for her depression which has helped.  Denied any suicidal and homicidal ideation.  Her diet has not been very healthy.  Sleep has been good with her medication.  Lynn does not exercise regularly.  Denied any chest pain, shortness of air, dizziness, vision changes or swelling of ankles.  Has no complaints today.     Objective   Vital Signs:   /90   Pulse 84   Temp 97.2 °F (36.2 °C) (Temporal)   Ht 157.5 cm (62\")   Wt 78 kg (172 lb)   SpO2 99%   BMI 31.46 kg/m²     Physical Exam  Vitals and nursing note reviewed.   Constitutional:       Appearance: Normal appearance. She is well-developed and well-groomed. She is obese.      Interventions: Face mask in place.   HENT:      Head: Normocephalic and atraumatic.   Neck:      Thyroid: No thyroid mass, thyromegaly or thyroid tenderness.      Vascular: No carotid bruit.      Trachea: Trachea and phonation normal. No tracheal tenderness.   Cardiovascular:      Rate and Rhythm: Normal rate and regular rhythm.      Pulses: Normal pulses.      Heart sounds: Normal heart sounds, S1 normal and S2 normal. No murmur heard.     Pulmonary:      Effort: Pulmonary effort is normal.      Breath sounds: Normal breath sounds and air entry.   Abdominal:      General: Bowel sounds are normal.      Palpations: Abdomen is soft. There is no hepatomegaly.      Tenderness: There is no abdominal tenderness. There is no right CVA tenderness, left CVA tenderness, guarding or rebound. Negative signs include Calvillo's sign, Rovsing's sign, McBurney's sign, psoas sign and obturator sign.   Musculoskeletal:      " Cervical back: Neck supple.      Right lower leg: No edema.      Left lower leg: No edema.   Skin:     General: Skin is warm and dry.      Capillary Refill: Capillary refill takes less than 2 seconds.   Neurological:      Mental Status: She is alert and oriented to person, place, and time.   Psychiatric:         Attention and Perception: Attention and perception normal.         Mood and Affect: Mood and affect normal.         Speech: Speech normal.         Behavior: Behavior normal. Behavior is cooperative.         Thought Content: Thought content normal.         Cognition and Memory: Cognition and memory normal.         Judgment: Judgment normal.     I was wearing a surgical mask and face shield during the entire office visit/encounter.     Result Review :     CMP    CMP 5/17/21   Glucose 96   BUN 14   Creatinine 0.84   eGFR Non  Am 71   eGFR African Am 86   Sodium 140   Potassium 4.1   Chloride 104   Calcium 10.0   Total Protein 6.9   Albumin 4.60   Globulin 2.3   Total Bilirubin 0.3   Alkaline Phosphatase 88   AST (SGOT) 22   ALT (SGPT) 28      Comments are available for some flowsheets but are not being displayed.           CBC    CBC 5/17/21   WBC 4.98   RBC 3.92   Hemoglobin 14.3   Hematocrit 41.3   .4 (A)   MCH 36.5 (A)   MCHC 34.6   RDW 13.0   Platelets 177   (A) Abnormal value            Lipid Panel    Lipid Panel 5/17/21   Total Cholesterol 227 (A)   Triglycerides 218 (A)   HDL Cholesterol 56   VLDL Cholesterol 39   LDL Cholesterol  132 (A)   LDL/HDL Ratio 2.28   (A) Abnormal value       Comments are available for some flowsheets but are not being displayed.           TSH    TSH 5/17/21   TSH 2.880                     Assessment and Plan    Diagnoses and all orders for this visit:    1. Moderate episode of recurrent major depressive disorder (CMS/HCC) (Primary)    2. Primary insomnia  -     ALPRAZolam (Xanax) 0.25 MG tablet; Take 1 tablet by mouth At Night As Needed for Anxiety or Sleep.   Dispense: 30 tablet; Refill: 0    3. Mixed hyperlipidemia    4. TRISTIN (generalized anxiety disorder)  -     ALPRAZolam (Xanax) 0.25 MG tablet; Take 1 tablet by mouth At Night As Needed for Anxiety or Sleep.  Dispense: 30 tablet; Refill: 0    1.  Chronic and stable major depressive disorder: Doing well with her Pristiq medication.  No refills are needed at this time.  2.  Chronic and stable generalized anxiety disorder with insomnia: Takes the Xanax nightly which is help with her anxiety and sleep issues.  I have sent a refill to pharmacy.  See below for Jorge information.  Lynn will return to office in 6 months for reevaluation.  3.  Chronic and stable hyperlipidemia: Have reviewed her lab work from above.  Cholesterol and triglycerides were elevated.  Stressed the importance of decreasing carbohydrates, sugars and fatty food in diet.  Also increase physical activity.  We will recheck lab work in 6 months.  If no improvement will consider starting statin medication.      Follow Up   Return in about 6 months (around 11/26/2021), or sleep/anxiety.  Patient was given instructions and counseling regarding her condition or for health maintenance advice. Please see specific information pulled into the AVS if appropriate.       As part of this patient's treatment plan I am prescribing controlled substances.  The patient has been made aware of appropriate use of such medications, including potential risk of somnolence. Limited ability to drive and/or work safely, and potential for dependence or overdose.  It has also been made clear that these medications are for use by this patient only, without concomitant use of alcohol or other substances unless prescribed.  JORGE report has been reviewed and scanned into the patient's chart.  Joreg number is  [id number] dated May 24,2021.  TRA [facility]oz [facility]  [address] [place] [address]  [phone]  Dept: [phone]  Dept Fax: [phone]  Loc: [phone]  Loc Fax: [phone]

## 2021-07-12 DIAGNOSIS — Z79.899 HIGH RISK MEDICATION USE: ICD-10-CM

## 2021-07-12 DIAGNOSIS — B35.1 ONYCHOMYCOSIS: ICD-10-CM

## 2021-07-12 RX ORDER — TERBINAFINE HYDROCHLORIDE 250 MG/1
250 TABLET ORAL DAILY
Qty: 45 TABLET | Refills: 0 | OUTPATIENT
Start: 2021-07-12

## 2021-07-23 DIAGNOSIS — F33.1 MODERATE EPISODE OF RECURRENT MAJOR DEPRESSIVE DISORDER (HCC): ICD-10-CM

## 2021-07-23 DIAGNOSIS — F41.1 GAD (GENERALIZED ANXIETY DISORDER): ICD-10-CM

## 2021-07-23 DIAGNOSIS — F51.01 PRIMARY INSOMNIA: ICD-10-CM

## 2021-07-23 RX ORDER — DESVENLAFAXINE SUCCINATE 50 MG/1
TABLET, EXTENDED RELEASE ORAL
Qty: 90 TABLET | Refills: 1 | OUTPATIENT
Start: 2021-07-23

## 2021-07-26 RX ORDER — ALPRAZOLAM 0.25 MG/1
0.25 TABLET ORAL NIGHTLY PRN
Qty: 30 TABLET | Refills: 0 | Status: SHIPPED | OUTPATIENT
Start: 2021-07-26 | End: 2021-08-23

## 2021-08-03 ENCOUNTER — TRANSCRIBE ORDERS (OUTPATIENT)
Dept: ADMINISTRATIVE | Facility: HOSPITAL | Age: 56
End: 2021-08-03

## 2021-08-03 DIAGNOSIS — Z12.31 VISIT FOR SCREENING MAMMOGRAM: Primary | ICD-10-CM

## 2021-08-16 DIAGNOSIS — F41.1 GAD (GENERALIZED ANXIETY DISORDER): ICD-10-CM

## 2021-08-16 DIAGNOSIS — F33.1 MODERATE EPISODE OF RECURRENT MAJOR DEPRESSIVE DISORDER (HCC): ICD-10-CM

## 2021-08-16 RX ORDER — DESVENLAFAXINE 100 MG/1
TABLET, EXTENDED RELEASE ORAL
Qty: 90 TABLET | Refills: 0 | Status: SHIPPED | OUTPATIENT
Start: 2021-08-16 | End: 2021-11-15

## 2021-08-23 DIAGNOSIS — F41.1 GAD (GENERALIZED ANXIETY DISORDER): ICD-10-CM

## 2021-08-23 DIAGNOSIS — F51.01 PRIMARY INSOMNIA: ICD-10-CM

## 2021-08-23 RX ORDER — ALPRAZOLAM 0.25 MG/1
0.25 TABLET ORAL NIGHTLY PRN
Qty: 30 TABLET | Refills: 5 | Status: SHIPPED | OUTPATIENT
Start: 2021-08-23 | End: 2022-02-21 | Stop reason: SDUPTHER

## 2021-09-01 ENCOUNTER — HOSPITAL ENCOUNTER (OUTPATIENT)
Dept: MAMMOGRAPHY | Facility: HOSPITAL | Age: 56
Discharge: HOME | End: 2021-09-01
Admitting: OBSTETRICS & GYNECOLOGY

## 2021-09-01 DIAGNOSIS — Z12.31 VISIT FOR SCREENING MAMMOGRAM: ICD-10-CM

## 2021-09-01 PROCEDURE — 77063 BREAST TOMOSYNTHESIS BI: CPT

## 2021-09-01 PROCEDURE — 77067 SCR MAMMO BI INCL CAD: CPT

## 2021-09-03 ENCOUNTER — APPOINTMENT (OUTPATIENT)
Dept: MAMMOGRAPHY | Facility: HOSPITAL | Age: 56
End: 2021-09-03

## 2021-11-10 DIAGNOSIS — E55.9 VITAMIN D DEFICIENCY: ICD-10-CM

## 2021-11-10 RX ORDER — ERGOCALCIFEROL 1.25 MG/1
50000 CAPSULE ORAL
Qty: 12 CAPSULE | Refills: 1 | Status: SHIPPED | OUTPATIENT
Start: 2021-11-10 | End: 2022-04-25

## 2021-11-13 DIAGNOSIS — F41.1 GAD (GENERALIZED ANXIETY DISORDER): ICD-10-CM

## 2021-11-13 DIAGNOSIS — F33.1 MODERATE EPISODE OF RECURRENT MAJOR DEPRESSIVE DISORDER (HCC): ICD-10-CM

## 2021-11-15 RX ORDER — DESVENLAFAXINE 100 MG/1
100 TABLET, EXTENDED RELEASE ORAL DAILY
Qty: 90 TABLET | Refills: 0 | Status: SHIPPED | OUTPATIENT
Start: 2021-11-15 | End: 2022-02-21 | Stop reason: SDUPTHER

## 2021-12-09 PROBLEM — Z80.0 FAMILY HISTORY OF COLON CANCER: Status: ACTIVE | Noted: 2021-09-21

## 2022-02-21 DIAGNOSIS — F33.1 MODERATE EPISODE OF RECURRENT MAJOR DEPRESSIVE DISORDER: ICD-10-CM

## 2022-02-21 DIAGNOSIS — F51.01 PRIMARY INSOMNIA: ICD-10-CM

## 2022-02-21 DIAGNOSIS — F41.1 GAD (GENERALIZED ANXIETY DISORDER): ICD-10-CM

## 2022-02-21 RX ORDER — DESVENLAFAXINE 100 MG/1
100 TABLET, EXTENDED RELEASE ORAL DAILY
Qty: 90 TABLET | Refills: 0 | Status: SHIPPED | OUTPATIENT
Start: 2022-02-21 | End: 2022-08-05

## 2022-02-21 RX ORDER — ALPRAZOLAM 0.25 MG/1
0.25 TABLET ORAL NIGHTLY PRN
Qty: 30 TABLET | Refills: 0 | Status: SHIPPED | OUTPATIENT
Start: 2022-02-21 | End: 2022-03-10 | Stop reason: DRUGHIGH

## 2022-02-21 NOTE — TELEPHONE ENCOUNTER
PATIENT IS COMPLETELY OUT OF MEDS, AND HAS APPT         Caller: Speaker, Lynn MATTHEWS    Relationship: Self    Best call back number:   Requested Prescriptions:   Requested Prescriptions     Pending Prescriptions Disp Refills   • desvenlafaxine (PRISTIQ) 100 MG 24 hr tablet 90 tablet 0     Sig: Take 1 tablet by mouth Daily. Needs an appointment before next refill      ALPRAZolam (XANAX) 0.25 MG tablet  0.25 mg, Nightly PRN 5 ordered  EditCancel Reorder       Summary: TAKE 1 TABLET BY MOUTH AT NIGHT AS NEEDED FOR ANXIETY OR SLEEP.,              Pharmacy where request should be sent: [facility] #[id number] - [place], [place] - [address] [facility] [address] [facility] [address] - [phone] ph - [phone] FX     Additional details provided by patient:     Does the patient have less than a 3 day supply:  [x] Yes  [] No    Debby Groves Rep   02/21/22 09:14 EST

## 2022-02-23 ENCOUNTER — OFFICE VISIT (OUTPATIENT)
Dept: FAMILY MEDICINE CLINIC | Facility: CLINIC | Age: 57
End: 2022-02-23

## 2022-02-23 VITALS
TEMPERATURE: 97.8 F | HEIGHT: 62 IN | RESPIRATION RATE: 15 BRPM | SYSTOLIC BLOOD PRESSURE: 120 MMHG | WEIGHT: 165 LBS | BODY MASS INDEX: 30.36 KG/M2 | HEART RATE: 115 BPM | OXYGEN SATURATION: 100 % | DIASTOLIC BLOOD PRESSURE: 80 MMHG

## 2022-02-23 DIAGNOSIS — Z23 NEED FOR TETANUS, DIPHTHERIA, AND ACELLULAR PERTUSSIS (TDAP) VACCINE IN PATIENT OF ADOLESCENT AGE OR OLDER: ICD-10-CM

## 2022-02-23 DIAGNOSIS — F51.01 PRIMARY INSOMNIA: ICD-10-CM

## 2022-02-23 DIAGNOSIS — F33.1 MODERATE EPISODE OF RECURRENT MAJOR DEPRESSIVE DISORDER: Primary | ICD-10-CM

## 2022-02-23 DIAGNOSIS — Z79.899 HIGH RISK MEDICATION USE: ICD-10-CM

## 2022-02-23 PROCEDURE — 90715 TDAP VACCINE 7 YRS/> IM: CPT | Performed by: PHYSICIAN ASSISTANT

## 2022-02-23 PROCEDURE — 99213 OFFICE O/P EST LOW 20 MIN: CPT | Performed by: PHYSICIAN ASSISTANT

## 2022-02-23 PROCEDURE — 90471 IMMUNIZATION ADMIN: CPT | Performed by: PHYSICIAN ASSISTANT

## 2022-02-23 NOTE — PROGRESS NOTES
"Chief Complaint  Depression and Anxiety    Subjective          Lynn R Speaker presents to [facility] PRIMARY [facility]  History of Present Illness  Lynn is a 56 year old female who presents for depression/anxiety/insomnia management.  States she has been taking the Xanax nightly as instructed.  The medication has helped with her symptoms and sleep.  Denied any suicidal or homicidal ideation.  Lynn states she has had difficulty obtaining medication due to pharmacy issues.  Has no complaints today.  Review of Systems   All other systems reviewed and are negative.    Objective   Vital Signs:   /80   Pulse 115   Temp 97.8 °F (36.6 °C)   Resp 15   Ht 157.5 cm (62\")   Wt 74.8 kg (165 lb)   SpO2 100%   BMI 30.18 kg/m²     Physical Exam  Vitals and nursing note reviewed.   Constitutional:       Appearance: Normal appearance. She is well-developed and well-groomed. She is obese.      Interventions: Face mask in place.   HENT:      Head: Normocephalic and atraumatic.   Neck:      Thyroid: No thyroid mass, thyromegaly or thyroid tenderness.      Trachea: Trachea and phonation normal. No tracheal tenderness.   Cardiovascular:      Rate and Rhythm: Normal rate and regular rhythm.      Pulses: Normal pulses.      Heart sounds: Normal heart sounds, S1 normal and S2 normal. No murmur heard.      Pulmonary:      Effort: Pulmonary effort is normal.      Breath sounds: Normal breath sounds and air entry.   Abdominal:      General: Bowel sounds are normal.      Palpations: Abdomen is soft. There is no hepatomegaly.      Tenderness: There is no abdominal tenderness. There is no right CVA tenderness, left CVA tenderness, guarding or rebound. Negative signs include Calvillo's sign, Rovsing's sign, McBurney's sign and psoas sign.   Musculoskeletal:      Cervical back: Neck supple.      Right lower leg: No edema.      Left lower leg: No edema.   Skin:     General: Skin is warm and dry.      Capillary Refill: " Capillary refill takes less than 2 seconds.   Neurological:      Mental Status: She is alert and oriented to person, place, and time.   Psychiatric:         Attention and Perception: Attention and perception normal.         Mood and Affect: Mood and affect normal.         Speech: Speech normal.         Behavior: Behavior normal. Behavior is cooperative.         Thought Content: Thought content normal.         Cognition and Memory: Cognition and memory normal.         Judgment: Judgment normal.     I wore a facial mask, face shield, and gloves during this patient encounter.  Patient also wearing a surgical mask.  Hand hygiene performed before and after seeing the patient.     Result Review :                 Assessment and Plan    Diagnoses and all orders for this visit:    1. Moderate episode of recurrent major depressive disorder (HCC) (Primary)    2. Primary insomnia  -     Compliance Drug Analysis, Ur - Urine, Clean Catch    3. High risk medication use  -     Compliance Drug Analysis, Ur - Urine, Clean Catch    4. Need for tetanus, diphtheria, and acellular pertussis (Tdap) vaccine in patient of adolescent age or older  -     Tdap Vaccine Greater Than or Equal To 6yo IM    1.  Chronic and stable major depressive disorder with insomnia.  She is doing well with the Xanax medication.  Takes this once daily.  She has signed the appropriate agreement and consent forms for the medication.  Will have a urine drug screen collected for compliance purposes.  No refills are needed today.  Will return to office in the next few months for annual physical examination with reevaluation of her shin/insomnia.  2.  Need for updated Tdap vaccination: Lynn has given written consent to receive updated Tdap vaccination today.      Follow Up   Return in about 4 months (around 6/23/2022), or labs prior.  Patient was given instructions and counseling regarding her condition or for health maintenance advice. Please see specific  information pulled into the AVS if appropriate.     TRA [facility]  [address] [address]  Dept: [phone]  Dept Fax: [phone]  Loc: [phone]  Loc Fax: [phone]

## 2022-03-02 LAB — DRUGS UR: NORMAL

## 2022-03-10 ENCOUNTER — TELEPHONE (OUTPATIENT)
Dept: FAMILY MEDICINE CLINIC | Facility: CLINIC | Age: 57
End: 2022-03-10

## 2022-03-10 DIAGNOSIS — F41.1 GAD (GENERALIZED ANXIETY DISORDER): ICD-10-CM

## 2022-03-10 DIAGNOSIS — F51.01 PRIMARY INSOMNIA: Primary | ICD-10-CM

## 2022-03-10 RX ORDER — ALPRAZOLAM 0.5 MG/1
TABLET ORAL
Qty: 30 TABLET | Refills: 0 | Status: SHIPPED | OUTPATIENT
Start: 2022-03-10 | End: 2022-04-25

## 2022-03-10 NOTE — TELEPHONE ENCOUNTER
Caller: SpeakerLynn    Relationship: Self    Best call back number:[phone]    Requested Prescriptions: ALPRAZolam (XANAX) tablet       Pharmacy where request should be sent:    [facility]/pharmacy #[id number]  [facility] [address] [address] AT [facility] [address] [place] [address] - [phone]  - [phone]   [phone]  Additional details provided by patient: PATIENT IS CALLING TO REQUEST A STRONGER DOSE OF THE ABOVE MEDICATION.  SHE STATES IT WAS DISCUSSED AT LAST VISIT.    Does the patient have less than a 3 day supply:  [x] Yes  [] No    Randi Buckley, RegSched Rep   03/10/22 10:40 EST     PLEASE ADVISE.     Prior Authorization Approval    Authorization Effective Date: 12/4/2018  Authorization Expiration Date: 12/4/2019  Medication: Omnitrope 10MG-Approved  Approved Dose/Quantity: 1/25 days  Reference #: [id number]   Insurance Company: [employer] [place] - Phone [phone] Fax [phone]  Expected CoPay: $150.00     CoPay Card Available: Yes   Foundation Assistance Needed:    Which Pharmacy is filling the prescription (Not needed for infusion/clinic administered): [facility] - [place], [place] - [address]  Pharmacy Notified:    Patient Notified:

## 2022-04-22 DIAGNOSIS — F41.1 GAD (GENERALIZED ANXIETY DISORDER): ICD-10-CM

## 2022-04-22 DIAGNOSIS — F51.01 PRIMARY INSOMNIA: ICD-10-CM

## 2022-04-22 DIAGNOSIS — E55.9 VITAMIN D DEFICIENCY: ICD-10-CM

## 2022-04-25 RX ORDER — ALPRAZOLAM 0.5 MG/1
TABLET ORAL
Qty: 30 TABLET | Refills: 0 | Status: SHIPPED | OUTPATIENT
Start: 2022-04-25 | End: 2022-05-16

## 2022-04-25 RX ORDER — ERGOCALCIFEROL 1.25 MG/1
CAPSULE ORAL
Qty: 12 CAPSULE | Refills: 1 | Status: SHIPPED | OUTPATIENT
Start: 2022-04-25 | End: 2022-10-05

## 2022-05-14 DIAGNOSIS — F51.01 PRIMARY INSOMNIA: ICD-10-CM

## 2022-05-14 DIAGNOSIS — F41.1 GAD (GENERALIZED ANXIETY DISORDER): ICD-10-CM

## 2022-05-15 DIAGNOSIS — F51.01 PRIMARY INSOMNIA: ICD-10-CM

## 2022-05-15 DIAGNOSIS — F41.1 GAD (GENERALIZED ANXIETY DISORDER): ICD-10-CM

## 2022-05-16 RX ORDER — ALPRAZOLAM 0.5 MG/1
0.5 TABLET ORAL NIGHTLY PRN
Qty: 30 TABLET | Refills: 0 | Status: SHIPPED | OUTPATIENT
Start: 2022-05-16 | End: 2022-07-05 | Stop reason: SDUPTHER

## 2022-05-16 RX ORDER — ALPRAZOLAM 0.25 MG/1
0.25 TABLET ORAL NIGHTLY PRN
Qty: 30 TABLET | Refills: 0 | OUTPATIENT
Start: 2022-05-16

## 2022-07-05 DIAGNOSIS — F41.1 GAD (GENERALIZED ANXIETY DISORDER): ICD-10-CM

## 2022-07-05 DIAGNOSIS — F51.01 PRIMARY INSOMNIA: ICD-10-CM

## 2022-07-05 NOTE — TELEPHONE ENCOUNTER
Caller: SpeakerLynn    Relationship: Self    Best call back number: [phone]     Requested Prescriptions:   Requested Prescriptions     Pending Prescriptions Disp Refills   • ALPRAZolam (XANAX) 0.5 MG tablet 30 tablet 0     Sig: Take 1 tablet by mouth At Night As Needed for Sleep.        Pharmacy where request should be sent: [facility]/PHARMACY #[id number] - [facility] [place] - [address] [address] AT [facility] OF [address] [place] [address] - [phone]  - [phone]      Additional details provided by patient: PATIENT HAS ONE TABLET LEFT    Does the patient have less than a 3 day supply:  [x] Yes  [] No    Debby Liu Rep   07/05/22 09:13 EDT

## 2022-07-06 RX ORDER — ALPRAZOLAM 0.5 MG/1
0.5 TABLET ORAL NIGHTLY PRN
Qty: 30 TABLET | Refills: 0 | Status: SHIPPED | OUTPATIENT
Start: 2022-07-06 | End: 2022-09-08

## 2022-07-29 ENCOUNTER — TRANSCRIBE ORDERS (OUTPATIENT)
Dept: ADMINISTRATIVE | Facility: HOSPITAL | Age: 57
End: 2022-07-29

## 2022-07-29 DIAGNOSIS — Z12.31 VISIT FOR SCREENING MAMMOGRAM: Primary | ICD-10-CM

## 2022-08-05 DIAGNOSIS — F41.1 GAD (GENERALIZED ANXIETY DISORDER): ICD-10-CM

## 2022-08-05 DIAGNOSIS — F33.1 MODERATE EPISODE OF RECURRENT MAJOR DEPRESSIVE DISORDER: ICD-10-CM

## 2022-08-05 RX ORDER — DESVENLAFAXINE 100 MG/1
100 TABLET, EXTENDED RELEASE ORAL DAILY
Qty: 90 TABLET | Refills: 0 | Status: SHIPPED | OUTPATIENT
Start: 2022-08-05 | End: 2022-09-12

## 2022-08-26 DIAGNOSIS — E78.2 MIXED HYPERLIPIDEMIA: ICD-10-CM

## 2022-08-26 DIAGNOSIS — Z00.00 ENCOUNTER FOR ANNUAL PHYSICAL EXAM: Primary | ICD-10-CM

## 2022-08-26 DIAGNOSIS — E55.9 VITAMIN D INSUFFICIENCY: ICD-10-CM

## 2022-08-26 DIAGNOSIS — R53.83 FATIGUE, UNSPECIFIED TYPE: ICD-10-CM

## 2022-08-30 LAB
25(OH)D3+25(OH)D2 SERPL-MCNC: 35.6 NG/ML (ref 30–100)
ALBUMIN SERPL-MCNC: 4.7 G/DL (ref 3.8–4.9)
ALBUMIN/GLOB SERPL: 2 {RATIO} (ref 1.2–2.2)
ALP SERPL-CCNC: 80 IU/L (ref 44–121)
ALT SERPL-CCNC: 23 IU/L (ref 0–32)
AST SERPL-CCNC: 20 IU/L (ref 0–40)
BASOPHILS # BLD AUTO: 0 X10E3/UL (ref 0–0.2)
BASOPHILS NFR BLD AUTO: 0 %
BILIRUB SERPL-MCNC: 0.4 MG/DL (ref 0–1.2)
BUN SERPL-MCNC: 10 MG/DL (ref 6–24)
BUN/CREAT SERPL: 13 (ref 9–23)
CALCIUM SERPL-MCNC: 9.5 MG/DL (ref 8.7–10.2)
CHLORIDE SERPL-SCNC: 103 MMOL/L (ref 96–106)
CHOLEST SERPL-MCNC: 292 MG/DL (ref 100–199)
CO2 SERPL-SCNC: 23 MMOL/L (ref 20–29)
CREAT SERPL-MCNC: 0.76 MG/DL (ref 0.57–1)
EGFRCR-CYS SERPLBLD CKD-EPI 2021: 92 ML/MIN/1.73
EOSINOPHIL # BLD AUTO: 0 X10E3/UL (ref 0–0.4)
EOSINOPHIL NFR BLD AUTO: 1 %
ERYTHROCYTE [DISTWIDTH] IN BLOOD BY AUTOMATED COUNT: 12.8 % (ref 11.7–15.4)
GLOBULIN SER CALC-MCNC: 2.4 G/DL (ref 1.5–4.5)
GLUCOSE SERPL-MCNC: 90 MG/DL (ref 65–99)
HCT VFR BLD AUTO: 39.2 % (ref 34–46.6)
HDLC SERPL-MCNC: 50 MG/DL
HGB BLD-MCNC: 13.4 G/DL (ref 11.1–15.9)
IMM GRANULOCYTES # BLD AUTO: 0 X10E3/UL (ref 0–0.1)
IMM GRANULOCYTES NFR BLD AUTO: 0 %
LDLC SERPL CALC-MCNC: 186 MG/DL (ref 0–99)
LDLC/HDLC SERPL: 3.7 RATIO (ref 0–3.2)
LYMPHOCYTES # BLD AUTO: 2.1 X10E3/UL (ref 0.7–3.1)
LYMPHOCYTES NFR BLD AUTO: 46 %
MCH RBC QN AUTO: 35.4 PG (ref 26.6–33)
MCHC RBC AUTO-ENTMCNC: 34.2 G/DL (ref 31.5–35.7)
MCV RBC AUTO: 103 FL (ref 79–97)
MONOCYTES # BLD AUTO: 0.4 X10E3/UL (ref 0.1–0.9)
MONOCYTES NFR BLD AUTO: 8 %
NEUTROPHILS # BLD AUTO: 2.1 X10E3/UL (ref 1.4–7)
NEUTROPHILS NFR BLD AUTO: 45 %
PLATELET # BLD AUTO: 186 X10E3/UL (ref 150–450)
POTASSIUM SERPL-SCNC: 4.4 MMOL/L (ref 3.5–5.2)
PROT SERPL-MCNC: 7.1 G/DL (ref 6–8.5)
RBC # BLD AUTO: 3.79 X10E6/UL (ref 3.77–5.28)
SODIUM SERPL-SCNC: 141 MMOL/L (ref 134–144)
TRIGL SERPL-MCNC: 289 MG/DL (ref 0–149)
TSH SERPL DL<=0.005 MIU/L-ACNC: 1.08 UIU/ML (ref 0.45–4.5)
VLDLC SERPL CALC-MCNC: 56 MG/DL (ref 5–40)
WBC # BLD AUTO: 4.5 X10E3/UL (ref 3.4–10.8)

## 2022-09-07 ENCOUNTER — HOSPITAL ENCOUNTER (OUTPATIENT)
Dept: MAMMOGRAPHY | Facility: HOSPITAL | Age: 57
Discharge: HOME | End: 2022-09-07
Admitting: OBSTETRICS & GYNECOLOGY

## 2022-09-07 DIAGNOSIS — Z12.31 VISIT FOR SCREENING MAMMOGRAM: ICD-10-CM

## 2022-09-07 PROCEDURE — 77063 BREAST TOMOSYNTHESIS BI: CPT

## 2022-09-07 PROCEDURE — 77067 SCR MAMMO BI INCL CAD: CPT

## 2022-09-08 DIAGNOSIS — F51.01 PRIMARY INSOMNIA: ICD-10-CM

## 2022-09-08 DIAGNOSIS — F41.1 GAD (GENERALIZED ANXIETY DISORDER): ICD-10-CM

## 2022-09-08 RX ORDER — ALPRAZOLAM 0.5 MG/1
0.5 TABLET ORAL NIGHTLY PRN
Qty: 30 TABLET | Refills: 0 | Status: SHIPPED | OUTPATIENT
Start: 2022-09-08 | End: 2022-10-11

## 2022-09-12 ENCOUNTER — OFFICE VISIT (OUTPATIENT)
Dept: FAMILY MEDICINE CLINIC | Facility: CLINIC | Age: 57
End: 2022-09-12

## 2022-09-12 VITALS
SYSTOLIC BLOOD PRESSURE: 128 MMHG | HEIGHT: 62 IN | TEMPERATURE: 98 F | WEIGHT: 168 LBS | RESPIRATION RATE: 14 BRPM | HEART RATE: 96 BPM | DIASTOLIC BLOOD PRESSURE: 80 MMHG | BODY MASS INDEX: 30.91 KG/M2 | OXYGEN SATURATION: 98 %

## 2022-09-12 DIAGNOSIS — R73.9 HYPERGLYCEMIA: ICD-10-CM

## 2022-09-12 DIAGNOSIS — F33.1 MODERATE EPISODE OF RECURRENT MAJOR DEPRESSIVE DISORDER: ICD-10-CM

## 2022-09-12 DIAGNOSIS — F51.01 PRIMARY INSOMNIA: ICD-10-CM

## 2022-09-12 DIAGNOSIS — Z79.899 HIGH RISK MEDICATION USE: ICD-10-CM

## 2022-09-12 DIAGNOSIS — E78.2 MIXED HYPERLIPIDEMIA: ICD-10-CM

## 2022-09-12 DIAGNOSIS — F41.1 GAD (GENERALIZED ANXIETY DISORDER): ICD-10-CM

## 2022-09-12 DIAGNOSIS — Z00.00 ENCOUNTER FOR ANNUAL PHYSICAL EXAM: Primary | ICD-10-CM

## 2022-09-12 PROCEDURE — 99396 PREV VISIT EST AGE 40-64: CPT | Performed by: PHYSICIAN ASSISTANT

## 2022-09-12 RX ORDER — DESVENLAFAXINE 100 MG/1
100 TABLET, EXTENDED RELEASE ORAL DAILY
Qty: 90 TABLET | Refills: 3 | Status: SHIPPED | OUTPATIENT
Start: 2022-09-12 | End: 2023-01-31 | Stop reason: SDUPTHER

## 2022-09-12 NOTE — PROGRESS NOTES
"Chief Complaint  Annual Exam, Depression (management), and Insomnia (management)    Subjective          Lynn MATTHEWS Speaker presents to [facility] PRIMARY [facility]  History of Present Illness  Lynn is a 57 year old annual physical exam, [place], anxiety and insomnia management.  She has gained 3 pounds since February 23, 2022.  States after reviewing her lab work she is making dietary changes.  She is now eating less carbohydrates and sugar.  Eating more salmon.  Recently started fish oil and other cholesterol-lowering supplements.  Sleep has been good with her medication.  Takes the Xanax as directed nightly.  Doing well with the Pristiq medication for anxiety and depression symptoms.  Denied any suicidal or homicidal ideation.  Bowel movements have been daily without dark black tarry stools.  Lynn denies any fevers, chills, upper respiratory symptoms, chest pain, shortness of air, cough, wheezing, abdominal pain, GI upset, urinary symptoms, or swelling of ankles.  Review of Systems   Constitutional: Negative.    HENT: Negative.    Eyes: Negative.    Respiratory: Negative.    Cardiovascular: Negative.    Gastrointestinal: Negative.    Genitourinary: Negative.    Musculoskeletal: Negative.    Skin: Negative.    Neurological: Negative.    Hematological: Negative.    Psychiatric/Behavioral: Negative.  Negative for sleep disturbance and suicidal ideas.   All other systems reviewed and are negative.    Objective   Vital Signs:   /80   Pulse 96   Temp 98 °F (36.7 °C)   Resp 14   Ht 157.5 cm (62\")   Wt 76.2 kg (168 lb)   SpO2 98%   BMI 30.73 kg/m²     Physical Exam  Vitals and nursing note reviewed.   Constitutional:       Appearance: Normal appearance. She is well-developed and well-groomed. She is obese.      Interventions: Face mask in place.   HENT:      Head: Normocephalic and atraumatic.      Jaw: There is normal jaw occlusion.      Right Ear: Hearing, tympanic membrane, ear canal and " external ear normal.      Left Ear: Hearing, tympanic membrane, ear canal and external ear normal.      Nose: Nose normal.      Right Sinus: No maxillary sinus tenderness or frontal sinus tenderness.      Left Sinus: No maxillary sinus tenderness or frontal sinus tenderness.      Mouth/Throat:      Lips: Pink.      Mouth: Mucous membranes are moist.      Dentition: Normal dentition.      Tongue: No lesions.      Pharynx: Oropharynx is clear. Uvula midline.      Tonsils: No tonsillar exudate.   Eyes:      General: Lids are normal.      Conjunctiva/sclera: Conjunctivae normal.      Pupils: Pupils are equal, round, and reactive to light.   Neck:      Thyroid: No thyroid mass, thyromegaly or thyroid tenderness.      Vascular: No carotid bruit.      Trachea: Trachea and phonation normal. No tracheal tenderness.   Cardiovascular:      Rate and Rhythm: Normal rate and regular rhythm.      Pulses: Normal pulses.      Heart sounds: Normal heart sounds, S1 normal and S2 normal. No murmur heard.  Pulmonary:      Effort: Pulmonary effort is normal.      Breath sounds: Normal breath sounds and air entry.   Abdominal:      General: Bowel sounds are normal.      Palpations: Abdomen is soft.      Tenderness: There is no abdominal tenderness. There is no right CVA tenderness, left CVA tenderness, guarding or rebound. Negative signs include Calvillo's sign, Rovsing's sign, McBurney's sign, psoas sign and obturator sign.   Musculoskeletal:      Cervical back: Neck supple.      Right lower leg: No edema.      Left lower leg: No edema.   Lymphadenopathy:      Cervical: No cervical adenopathy.      Right cervical: No superficial, deep or posterior cervical adenopathy.     Left cervical: No superficial, deep or posterior cervical adenopathy.   Skin:     General: Skin is warm and dry.      Capillary Refill: Capillary refill takes less than 2 seconds.   Neurological:      Mental Status: She is alert and oriented to person, place, and time.       Deep Tendon Reflexes:      Reflex Scores:       Patellar reflexes are 2+ on the right side and 2+ on the left side.  Psychiatric:         Attention and Perception: Attention and perception normal.         Mood and Affect: Mood and affect normal.         Speech: Speech normal.         Behavior: Behavior is cooperative.         Thought Content: Thought content normal.         Cognition and Memory: Cognition and memory normal.         Judgment: Judgment normal.     I wore a facial mask during this patient encounter.  Patient also wearing a surgical mask.  Hand hygiene performed before and after seeing the patient.     Result Review :     CMP    CMP 8/29/22   Glucose 90   BUN 10   Creatinine 0.76   Sodium 141   Potassium 4.4   Chloride 103   Calcium 9.5   Total Protein 7.1   Albumin 4.7   Globulin 2.4   Total Bilirubin 0.4   Alkaline Phosphatase 80   AST (SGOT) 20   ALT (SGPT) 23           CBC w/diff    CBC w/Diff 8/29/22   WBC 4.5   RBC 3.79   Hemoglobin 13.4   Hematocrit 39.2    (A)   MCH 35.4 (A)   MCHC 34.2   RDW 12.8   Platelets 186   Neutrophil Rel % 45   Lymphocyte Rel % 46   Monocyte Rel % 8   Eosinophil Rel % 1   Basophil Rel % 0   (A) Abnormal value            Lipid Panel    Lipid Panel 8/29/22   Total Cholesterol 292 (A)   Triglycerides 289 (A)   HDL Cholesterol 50   VLDL Cholesterol 56 (A)   LDL Cholesterol  186 (A)   LDL/HDL Ratio 3.7 (A)   (A) Abnormal value       Comments are available for some flowsheets but are not being displayed.           TSH    TSH 8/29/22   TSH 1.080                     Assessment and Plan    Diagnoses and all orders for this visit:    1. Encounter for annual physical exam (Primary)    2. Hyperglycemia    3. Mixed hyperlipidemia  -     Lipid Panel With LDL / HDL Ratio; Future  -     Lipid Panel With LDL / HDL Ratio; Future    4. High risk medication use    5. Primary insomnia    6. Moderate episode of recurrent major depressive disorder (HCC)  -     desvenlafaxine  (PRISTIQ) 100 MG 24 hr tablet; Take 1 tablet by mouth Daily.  Dispense: 90 tablet; Refill: 3    7. TRISTIN (generalized anxiety disorder)  -     desvenlafaxine (PRISTIQ) 100 MG 24 hr tablet; Take 1 tablet by mouth Daily.  Dispense: 90 tablet; Refill: 3    1.  Annual physical examination with Hyperlipidemia:   I reviewed above blood work with her at office visit today.  Cholesterol and triglyceride readings are elevated.  Stressed the importance of decreasing her's fatty food, carbohydrates and sugars in diet.  Will increase physical activity as well.  She has recently started fish oil and making dietary changes.  We will recheck lipid profile in 3 months.  If no improvement, may need to start statin medication.  2.  Chronic and stable insomnia with major depressive disorder/anxiety: I have sent a refill of her's Pristiq medication to pharmacy.  No refills are needed of the Xanax at this time.  Will return to office in 6 months for reevaluation.      Follow Up   Return in about 6 months (around 3/12/2023), or labs only FBW in 3  months/ anxiety insomnia-6 months-FBW.  Patient was given instructions and counseling regarding her condition or for health maintenance advice. Please see specific information pulled into the AVS if appropriate.     [facility]  [address] [place] [id number]  Dept: [phone]  Dept Fax: [phone]  Loc: [phone]  Loc Fax: [phone]

## 2022-10-05 DIAGNOSIS — E55.9 VITAMIN D DEFICIENCY: ICD-10-CM

## 2022-10-05 RX ORDER — ERGOCALCIFEROL 1.25 MG/1
CAPSULE ORAL
Qty: 12 CAPSULE | Refills: 1 | Status: SHIPPED | OUTPATIENT
Start: 2022-10-05 | End: 2023-03-16

## 2022-10-10 DIAGNOSIS — F51.01 PRIMARY INSOMNIA: ICD-10-CM

## 2022-10-10 DIAGNOSIS — F41.1 GAD (GENERALIZED ANXIETY DISORDER): ICD-10-CM

## 2022-10-11 RX ORDER — ALPRAZOLAM 0.5 MG/1
0.5 TABLET ORAL NIGHTLY PRN
Qty: 30 TABLET | Refills: 0 | Status: SHIPPED | OUTPATIENT
Start: 2022-10-11 | End: 2022-11-14

## 2022-11-14 DIAGNOSIS — F41.1 GAD (GENERALIZED ANXIETY DISORDER): ICD-10-CM

## 2022-11-14 DIAGNOSIS — F51.01 PRIMARY INSOMNIA: ICD-10-CM

## 2022-11-14 RX ORDER — ALPRAZOLAM 0.5 MG/1
0.5 TABLET ORAL NIGHTLY PRN
Qty: 30 TABLET | Refills: 0 | Status: SHIPPED | OUTPATIENT
Start: 2022-11-14 | End: 2022-12-12

## 2022-12-12 DIAGNOSIS — F41.1 GAD (GENERALIZED ANXIETY DISORDER): ICD-10-CM

## 2022-12-12 DIAGNOSIS — F51.01 PRIMARY INSOMNIA: ICD-10-CM

## 2022-12-12 RX ORDER — ALPRAZOLAM 0.5 MG/1
0.5 TABLET ORAL NIGHTLY PRN
Qty: 30 TABLET | Refills: 0 | Status: SHIPPED | OUTPATIENT
Start: 2022-12-12 | End: 2023-01-12

## 2023-01-11 DIAGNOSIS — F41.1 GAD (GENERALIZED ANXIETY DISORDER): ICD-10-CM

## 2023-01-11 DIAGNOSIS — F51.01 PRIMARY INSOMNIA: ICD-10-CM

## 2023-01-12 RX ORDER — ALPRAZOLAM 0.5 MG/1
0.5 TABLET ORAL NIGHTLY PRN
Qty: 30 TABLET | Refills: 0 | Status: SHIPPED | OUTPATIENT
Start: 2023-01-12 | End: 2023-02-15

## 2023-01-31 DIAGNOSIS — F33.1 MODERATE EPISODE OF RECURRENT MAJOR DEPRESSIVE DISORDER: ICD-10-CM

## 2023-01-31 DIAGNOSIS — F41.1 GAD (GENERALIZED ANXIETY DISORDER): ICD-10-CM

## 2023-01-31 RX ORDER — DESVENLAFAXINE 100 MG/1
100 TABLET, EXTENDED RELEASE ORAL DAILY
Qty: 90 TABLET | Refills: 0 | Status: SHIPPED | OUTPATIENT
Start: 2023-01-31

## 2023-02-14 DIAGNOSIS — F51.01 PRIMARY INSOMNIA: ICD-10-CM

## 2023-02-14 DIAGNOSIS — F41.1 GAD (GENERALIZED ANXIETY DISORDER): ICD-10-CM

## 2023-02-15 RX ORDER — ALPRAZOLAM 0.5 MG/1
0.5 TABLET ORAL NIGHTLY PRN
Qty: 30 TABLET | Refills: 0 | Status: SHIPPED | OUTPATIENT
Start: 2023-02-15 | End: 2023-03-17

## 2023-02-28 DIAGNOSIS — E78.2 MIXED HYPERLIPIDEMIA: ICD-10-CM

## 2023-02-28 DIAGNOSIS — F41.1 GAD (GENERALIZED ANXIETY DISORDER): ICD-10-CM

## 2023-02-28 DIAGNOSIS — E55.9 VITAMIN D INSUFFICIENCY: ICD-10-CM

## 2023-02-28 DIAGNOSIS — Z79.899 HIGH RISK MEDICATION USE: ICD-10-CM

## 2023-02-28 DIAGNOSIS — F51.01 PRIMARY INSOMNIA: ICD-10-CM

## 2023-02-28 DIAGNOSIS — E55.9 VITAMIN D DEFICIENCY: Primary | ICD-10-CM

## 2023-02-28 DIAGNOSIS — R53.83 FATIGUE, UNSPECIFIED TYPE: ICD-10-CM

## 2023-03-16 DIAGNOSIS — E55.9 VITAMIN D DEFICIENCY: ICD-10-CM

## 2023-03-16 LAB
25(OH)D3+25(OH)D2 SERPL-MCNC: 64.6 NG/ML (ref 30–100)
ALBUMIN SERPL-MCNC: 4.6 G/DL (ref 3.8–4.9)
ALBUMIN/GLOB SERPL: 1.9 {RATIO} (ref 1.2–2.2)
ALP SERPL-CCNC: 79 IU/L (ref 44–121)
ALT SERPL-CCNC: 17 IU/L (ref 0–32)
AST SERPL-CCNC: 20 IU/L (ref 0–40)
BASOPHILS # BLD AUTO: 0 X10E3/UL (ref 0–0.2)
BASOPHILS NFR BLD AUTO: 1 %
BILIRUB SERPL-MCNC: 0.4 MG/DL (ref 0–1.2)
BUN SERPL-MCNC: 10 MG/DL (ref 6–24)
BUN/CREAT SERPL: 12 (ref 9–23)
CALCIUM SERPL-MCNC: 9.5 MG/DL (ref 8.7–10.2)
CHLORIDE SERPL-SCNC: 100 MMOL/L (ref 96–106)
CHOLEST SERPL-MCNC: 253 MG/DL (ref 100–199)
CO2 SERPL-SCNC: 24 MMOL/L (ref 20–29)
CREAT SERPL-MCNC: 0.84 MG/DL (ref 0.57–1)
EGFRCR SERPLBLD CKD-EPI 2021: 82 ML/MIN/1.73
EOSINOPHIL # BLD AUTO: 0.1 X10E3/UL (ref 0–0.4)
EOSINOPHIL NFR BLD AUTO: 1 %
ERYTHROCYTE [DISTWIDTH] IN BLOOD BY AUTOMATED COUNT: 11.8 % (ref 11.7–15.4)
GLOBULIN SER CALC-MCNC: 2.4 G/DL (ref 1.5–4.5)
GLUCOSE SERPL-MCNC: 100 MG/DL (ref 70–99)
HCT VFR BLD AUTO: 39.9 % (ref 34–46.6)
HDLC SERPL-MCNC: 55 MG/DL
HGB BLD-MCNC: 13.7 G/DL (ref 11.1–15.9)
IMM GRANULOCYTES # BLD AUTO: 0 X10E3/UL (ref 0–0.1)
IMM GRANULOCYTES NFR BLD AUTO: 0 %
LDLC SERPL CALC-MCNC: 172 MG/DL (ref 0–99)
LDLC/HDLC SERPL: 3.1 RATIO (ref 0–3.2)
LYMPHOCYTES # BLD AUTO: 2.7 X10E3/UL (ref 0.7–3.1)
LYMPHOCYTES NFR BLD AUTO: 52 %
MCH RBC QN AUTO: 35.8 PG (ref 26.6–33)
MCHC RBC AUTO-ENTMCNC: 34.3 G/DL (ref 31.5–35.7)
MCV RBC AUTO: 104 FL (ref 79–97)
MONOCYTES # BLD AUTO: 0.4 X10E3/UL (ref 0.1–0.9)
MONOCYTES NFR BLD AUTO: 7 %
NEUTROPHILS # BLD AUTO: 2 X10E3/UL (ref 1.4–7)
NEUTROPHILS NFR BLD AUTO: 39 %
PLATELET # BLD AUTO: 201 X10E3/UL (ref 150–450)
POTASSIUM SERPL-SCNC: 4.3 MMOL/L (ref 3.5–5.2)
PROT SERPL-MCNC: 7 G/DL (ref 6–8.5)
RBC # BLD AUTO: 3.83 X10E6/UL (ref 3.77–5.28)
SODIUM SERPL-SCNC: 138 MMOL/L (ref 134–144)
TRIGL SERPL-MCNC: 142 MG/DL (ref 0–149)
TSH SERPL DL<=0.005 MIU/L-ACNC: 1.96 UIU/ML (ref 0.45–4.5)
VLDLC SERPL CALC-MCNC: 26 MG/DL (ref 5–40)
WBC # BLD AUTO: 5.1 X10E3/UL (ref 3.4–10.8)

## 2023-03-16 RX ORDER — ERGOCALCIFEROL 1.25 MG/1
CAPSULE ORAL
Qty: 12 CAPSULE | Refills: 1 | Status: SHIPPED | OUTPATIENT
Start: 2023-03-16

## 2023-03-17 DIAGNOSIS — F51.01 PRIMARY INSOMNIA: ICD-10-CM

## 2023-03-17 DIAGNOSIS — F41.1 GAD (GENERALIZED ANXIETY DISORDER): ICD-10-CM

## 2023-03-17 RX ORDER — ALPRAZOLAM 0.5 MG/1
0.5 TABLET ORAL NIGHTLY PRN
Qty: 30 TABLET | Refills: 0 | Status: SHIPPED | OUTPATIENT
Start: 2023-03-17

## 2023-03-22 LAB — DRUGS UR: NORMAL

## 2023-03-29 ENCOUNTER — OFFICE VISIT (OUTPATIENT)
Dept: FAMILY MEDICINE CLINIC | Facility: CLINIC | Age: 58
End: 2023-03-29
Payer: COMMERCIAL

## 2023-03-29 VITALS
HEIGHT: 62 IN | HEART RATE: 102 BPM | BODY MASS INDEX: 28.16 KG/M2 | DIASTOLIC BLOOD PRESSURE: 86 MMHG | OXYGEN SATURATION: 96 % | TEMPERATURE: 97.7 F | WEIGHT: 153 LBS | SYSTOLIC BLOOD PRESSURE: 130 MMHG | RESPIRATION RATE: 14 BRPM

## 2023-03-29 DIAGNOSIS — E78.2 MIXED HYPERLIPIDEMIA: Primary | ICD-10-CM

## 2023-03-29 DIAGNOSIS — R73.9 HYPERGLYCEMIA: ICD-10-CM

## 2023-03-29 DIAGNOSIS — F33.1 MODERATE EPISODE OF RECURRENT MAJOR DEPRESSIVE DISORDER: ICD-10-CM

## 2023-03-29 PROCEDURE — 99214 OFFICE O/P EST MOD 30 MIN: CPT | Performed by: PHYSICIAN ASSISTANT

## 2023-03-29 RX ORDER — ROSUVASTATIN CALCIUM 5 MG/1
5 TABLET, COATED ORAL DAILY
Qty: 90 TABLET | Refills: 2 | Status: SHIPPED | OUTPATIENT
Start: 2023-03-29

## 2023-03-29 NOTE — PROGRESS NOTES
"Chief Complaint  Insomnia (management), Hyperlipidemia (management), and Hyperglycemia (management)    Subjective          Lynn MATTHEWS Speaker presents to [facility] PRIMARY CARE  History of Present Illness  Lynn is a 57 year old female who insomnia, hyperlipidemia and hyperglycemia management.  She has lost 15 pounds since last office visit in September 2022.  She has been making dietary changes but decreasing portion sizes, sugars, carbohydrates and fatty food in diet.  Trying to be more active as well.  Doing well with the Xanax medication.  Takes daily as instructed.  Denied any suicidal or homicidal ideation.  Lynn denied any chest pain, shortness of air, wheezing, abdominal pain, dizziness, GI upset or swelling of ankles.     Objective   Vital Signs:   /86   Pulse 102   Temp 97.7 °F (36.5 °C)   Resp 14   Ht 157.5 cm (62\")   Wt 69.4 kg (153 lb)   SpO2 96%   BMI 27.98 kg/m²     Physical Exam  Vitals and nursing note reviewed.   Constitutional:       Appearance: Normal appearance. She is well-developed, well-groomed and overweight.   HENT:      Head: Normocephalic and atraumatic.   Neck:      Vascular: No carotid bruit.      Trachea: Trachea and phonation normal. No tracheal tenderness.   Cardiovascular:      Rate and Rhythm: Normal rate and regular rhythm.      Pulses: Normal pulses.      Heart sounds: Normal heart sounds, S1 normal and S2 normal. No murmur heard.  Pulmonary:      Effort: Pulmonary effort is normal.      Breath sounds: Normal breath sounds and air entry.   Abdominal:      General: Bowel sounds are normal.      Palpations: Abdomen is soft. There is no hepatomegaly.      Tenderness: There is no abdominal tenderness. There is no right CVA tenderness, left CVA tenderness, guarding or rebound. Negative signs include Calvillo's sign, Rovsing's sign, McBurney's sign, psoas sign and obturator sign.   Musculoskeletal:      Cervical back: Neck supple.      Right lower leg: No " edema.      Left lower leg: No edema.   Skin:     General: Skin is warm and dry.      Capillary Refill: Capillary refill takes less than 2 seconds.   Neurological:      Mental Status: She is alert and oriented to person, place, and time.   Psychiatric:         Attention and Perception: Attention and perception normal.         Mood and Affect: Mood and affect normal.         Speech: Speech normal.         Behavior: Behavior normal. Behavior is cooperative.         Thought Content: Thought content normal.         Cognition and Memory: Cognition and memory normal.         Judgment: Judgment normal.        Result Review :        Results for orders placed or performed in visit on 02/28/23   Lipid Panel With LDL / HDL Ratio    Specimen: Blood   Result Value Ref Range    Total Cholesterol 253 (H) 100 - 199 mg/dL    Triglycerides 142 0 - 149 mg/dL    HDL Cholesterol 55 >39 mg/dL    VLDL Cholesterol Brigido 26 5 - 40 mg/dL    LDL Chol Calc (NIH) 172 (H) 0 - 99 mg/dL    LDL/HDL RATIO 3.1 0.0 - 3.2 ratio   Comprehensive Metabolic Panel    Specimen: Blood   Result Value Ref Range    Glucose 100 (H) 70 - 99 mg/dL    BUN 10 6 - 24 mg/dL    Creatinine 0.84 0.57 - 1.00 mg/dL    EGFR Result 82 >59 mL/min/1.73    BUN/Creatinine Ratio 12 9 - 23    Sodium 138 134 - 144 mmol/L    Potassium 4.3 3.5 - 5.2 mmol/L    Chloride 100 96 - 106 mmol/L    Total CO2 24 20 - 29 mmol/L    Calcium 9.5 8.7 - 10.2 mg/dL    Total Protein 7.0 6.0 - 8.5 g/dL    Albumin 4.6 3.8 - 4.9 g/dL    Globulin 2.4 1.5 - 4.5 g/dL    A/G Ratio 1.9 1.2 - 2.2    Total Bilirubin 0.4 0.0 - 1.2 mg/dL    Alkaline Phosphatase 79 44 - 121 IU/L    AST (SGOT) 20 0 - 40 IU/L    ALT (SGPT) 17 0 - 32 IU/L   TSH    Specimen: Blood   Result Value Ref Range    TSH 1.960 0.450 - 4.500 uIU/mL   Vitamin D,25-Hydroxy    Specimen: Blood   Result Value Ref Range    25 Hydroxy, Vitamin D 64.6 30.0 - 100.0 ng/mL   Compliance Drug Analysis, Ur - Urine, Clean Catch    Specimen: Urine, Clean Catch    Result Value Ref Range    Report Summary FINAL    CBC & Differential    Specimen: Blood   Result Value Ref Range    WBC 5.1 3.4 - 10.8 x10E3/uL    RBC 3.83 3.77 - 5.28 x10E6/uL    Hemoglobin 13.7 11.1 - 15.9 g/dL    Hematocrit 39.9 34.0 - 46.6 %     (H) 79 - 97 fL    MCH 35.8 (H) 26.6 - 33.0 pg    MCHC 34.3 31.5 - 35.7 g/dL    RDW 11.8 11.7 - 15.4 %    Platelets 201 150 - 450 x10E3/uL    Neutrophil Rel % 39 Not Estab. %    Lymphocyte Rel % 52 Not Estab. %    Monocyte Rel % 7 Not Estab. %    Eosinophil Rel % 1 Not Estab. %    Basophil Rel % 1 Not Estab. %    Neutrophils Absolute 2.0 1.4 - 7.0 x10E3/uL    Lymphocytes Absolute 2.7 0.7 - 3.1 x10E3/uL    Monocytes Absolute 0.4 0.1 - 0.9 x10E3/uL    Eosinophils Absolute 0.1 0.0 - 0.4 x10E3/uL    Basophils Absolute 0.0 0.0 - 0.2 x10E3/uL    Immature Granulocyte Rel % 0 Not Estab. %    Immature Grans Absolute 0.0 0.0 - 0.1 x10E3/uL                 Assessment and Plan    Diagnoses and all orders for this visit:    1. Mixed hyperlipidemia (Primary)  -     rosuvastatin (Crestor) 5 MG tablet; Take 1 tablet by mouth Daily.  Dispense: 90 tablet; Refill: 2  -     Lipid Panel With LDL / HDL Ratio; Future    2. Hyperglycemia    3. Moderate episode of recurrent major depressive disorder (HCC)    1.  Chronic and uncontrolled hyperlipidemia: Have reviewed above blood work today with her.  Cholesterol readings are elevated.  We will start Crestor 5 mg once nightly.  We will recheck lipid profile in 3 months.  She will continue to eat healthy and exercise regularly.  2.  Chronic and stable hyper glycemia.  Fasting blood sugar was slightly elevated at 100.  She will decrease sugar and carbohydrates in diet.  Increase physical activity.  3.  Chronic and stable major depression: Doing well with the Xanax medication.  No refills are needed.  She has signed the appropriate agreement for upcoming medication refills.  Follow-up in 6 months.    I spent 24 minutes caring for Lynn on  this date of service. This time includes time spent by me in the following activities:preparing for the visit, reviewing tests, obtaining and/or reviewing a separately obtained history, performing a medically appropriate examination and/or evaluation , counseling and educating the patient/family/caregiver, ordering medications, tests, or procedures and documenting information in the medical record  Follow Up   Return chol in 3 months- PHY Exam in 6 months with labs prior.  Patient was given instructions and counseling regarding her condition or for health maintenance advice. Please see specific information pulled into the AVS if appropriate.     [facility]  [address] [facility] [place] [address]  Dept: [phone]  Dept Fax: [phone]  Loc: [phone]  Loc Fax: [phone]

## 2023-03-30 NOTE — PROGRESS NOTES
I, Dr. Tomás Hartmann, have reviewed the notes, assessments, and/or procedures performed by Anabella BALBUENA, that occurred within our practice at [facility] location, I concur with her documentation of Lynn Gomez. I have a current collaborative medical agreement with Anabella BALBUENA.

## 2023-04-20 DIAGNOSIS — F51.01 PRIMARY INSOMNIA: ICD-10-CM

## 2023-04-20 DIAGNOSIS — F41.1 GAD (GENERALIZED ANXIETY DISORDER): ICD-10-CM

## 2023-04-20 RX ORDER — ALPRAZOLAM 0.5 MG/1
0.5 TABLET ORAL NIGHTLY PRN
Qty: 30 TABLET | Refills: 0 | Status: SHIPPED | OUTPATIENT
Start: 2023-04-20

## 2023-05-20 DIAGNOSIS — F51.01 PRIMARY INSOMNIA: ICD-10-CM

## 2023-05-20 DIAGNOSIS — F41.1 GAD (GENERALIZED ANXIETY DISORDER): ICD-10-CM

## 2023-05-22 RX ORDER — ALPRAZOLAM 0.5 MG/1
0.5 TABLET ORAL NIGHTLY PRN
Qty: 30 TABLET | Refills: 0 | Status: SHIPPED | OUTPATIENT
Start: 2023-05-22

## 2023-06-19 DIAGNOSIS — E78.2 MIXED HYPERLIPIDEMIA: ICD-10-CM

## 2023-06-19 DIAGNOSIS — F51.01 PRIMARY INSOMNIA: ICD-10-CM

## 2023-06-19 DIAGNOSIS — F41.1 GAD (GENERALIZED ANXIETY DISORDER): ICD-10-CM

## 2023-06-19 RX ORDER — ALPRAZOLAM 0.5 MG/1
0.5 TABLET ORAL NIGHTLY PRN
Qty: 30 TABLET | Refills: 0 | Status: SHIPPED | OUTPATIENT
Start: 2023-06-19

## 2023-06-23 LAB
CHOLEST SERPL-MCNC: 164 MG/DL (ref 0–200)
HDLC SERPL-MCNC: 53 MG/DL (ref 40–60)
LDLC SERPL CALC-MCNC: 84 MG/DL (ref 0–100)
LDLC/HDLC SERPL: 1.51 {RATIO}
TRIGL SERPL-MCNC: 155 MG/DL (ref 0–150)
VLDLC SERPL CALC-MCNC: 27 MG/DL (ref 5–40)

## 2023-07-24 ENCOUNTER — TRANSCRIBE ORDERS (OUTPATIENT)
Dept: ADMINISTRATIVE | Facility: HOSPITAL | Age: 58
End: 2023-07-24
Payer: COMMERCIAL

## 2023-07-24 DIAGNOSIS — Z12.31 SCREENING MAMMOGRAM, ENCOUNTER FOR: Primary | ICD-10-CM

## 2023-08-27 DIAGNOSIS — E55.9 VITAMIN D DEFICIENCY: ICD-10-CM

## 2023-08-27 DIAGNOSIS — F51.01 PRIMARY INSOMNIA: ICD-10-CM

## 2023-08-27 DIAGNOSIS — F41.1 GAD (GENERALIZED ANXIETY DISORDER): ICD-10-CM

## 2023-08-28 RX ORDER — ERGOCALCIFEROL 1.25 MG/1
CAPSULE ORAL
Qty: 12 CAPSULE | Refills: 1 | Status: SHIPPED | OUTPATIENT
Start: 2023-08-28

## 2023-08-28 RX ORDER — ALPRAZOLAM 0.5 MG/1
0.5 TABLET ORAL NIGHTLY PRN
Qty: 30 TABLET | Refills: 0 | Status: SHIPPED | OUTPATIENT
Start: 2023-08-28

## 2023-09-08 ENCOUNTER — HOSPITAL ENCOUNTER (OUTPATIENT)
Dept: MAMMOGRAPHY | Facility: HOSPITAL | Age: 58
Discharge: HOME | End: 2023-09-08
Admitting: OBSTETRICS & GYNECOLOGY
Payer: COMMERCIAL

## 2023-09-08 DIAGNOSIS — Z12.31 SCREENING MAMMOGRAM, ENCOUNTER FOR: ICD-10-CM

## 2023-09-08 PROCEDURE — 77063 BREAST TOMOSYNTHESIS BI: CPT

## 2023-09-08 PROCEDURE — 77067 SCR MAMMO BI INCL CAD: CPT

## 2023-09-21 ENCOUNTER — OFFICE VISIT (OUTPATIENT)
Dept: FAMILY MEDICINE CLINIC | Facility: CLINIC | Age: 58
End: 2023-09-21
Payer: COMMERCIAL

## 2023-09-21 VITALS
BODY MASS INDEX: 25.21 KG/M2 | HEIGHT: 62 IN | TEMPERATURE: 98.2 F | WEIGHT: 137 LBS | HEART RATE: 96 BPM | SYSTOLIC BLOOD PRESSURE: 126 MMHG | DIASTOLIC BLOOD PRESSURE: 72 MMHG | OXYGEN SATURATION: 100 %

## 2023-09-21 DIAGNOSIS — E78.2 MIXED HYPERLIPIDEMIA: ICD-10-CM

## 2023-09-21 DIAGNOSIS — E55.9 VITAMIN D INSUFFICIENCY: ICD-10-CM

## 2023-09-21 DIAGNOSIS — F33.1 MODERATE EPISODE OF RECURRENT MAJOR DEPRESSIVE DISORDER: ICD-10-CM

## 2023-09-21 DIAGNOSIS — R73.9 HYPERGLYCEMIA: ICD-10-CM

## 2023-09-21 DIAGNOSIS — Z00.00 ENCOUNTER FOR ANNUAL PHYSICAL EXAM: Primary | ICD-10-CM

## 2023-09-21 PROCEDURE — 99396 PREV VISIT EST AGE 40-64: CPT | Performed by: PHYSICIAN ASSISTANT

## 2023-09-21 NOTE — PROGRESS NOTES
"Chief Complaint  Annual Exam, Depression (management), and Hyperlipidemia (management)    Subjective          Lynn MATTHEWS Speaker presents to [facility] PRIMARY CARE  History of Present Illness  Lynn is a 58 year old female who presents for hyperlipidemia and depression management.  Lynn has lost 16 pounds since last office visit.  States she has been eating healthier and .  She has been decreasing portion sizes as well.  Exercising by walking.  Bowel movements have been daily without dark black tarry stools.  Dental and vision is up-to-date.  Doing well with the Xanax medication.  Does take daily.  Denied any suicidal or homicidal ideation.  Sleep has been normal.  Lynn denied any fevers, chills, upper respiratory symptoms, chest pain, shortness of air, cough, wheezing, abdominal pain, GI upset or swelling of ankles.     Objective   Vital Signs:   /72   Pulse 96   Temp 98.2 °F (36.8 °C)   Ht 157.5 cm (62.01\")   Wt 62.1 kg (137 lb)   SpO2 100%   BMI 25.05 kg/m²     Physical Exam  Vitals and nursing note reviewed.   Constitutional:       Appearance: Normal appearance. She is well-developed, well-groomed and normal weight.      Interventions: Face mask in place.   HENT:      Head: Normocephalic and atraumatic.      Jaw: There is normal jaw occlusion.      Right Ear: Hearing, tympanic membrane, ear canal and external ear normal.      Left Ear: Hearing, tympanic membrane, ear canal and external ear normal.      Nose: Nose normal.      Right Sinus: No maxillary sinus tenderness or frontal sinus tenderness.      Left Sinus: No maxillary sinus tenderness or frontal sinus tenderness.      Mouth/Throat:      Lips: Pink.      Mouth: Mucous membranes are moist.      Dentition: Normal dentition.      Tongue: No lesions.      Pharynx: Oropharynx is clear. Uvula midline.      Tonsils: No tonsillar exudate.   Eyes:      General: Lids are normal.      Conjunctiva/sclera: Conjunctivae normal. "      Pupils: Pupils are equal, round, and reactive to light.   Neck:      Thyroid: No thyroid mass, thyromegaly or thyroid tenderness.      Vascular: No carotid bruit.      Trachea: Trachea and phonation normal. No tracheal tenderness.   Cardiovascular:      Rate and Rhythm: Normal rate and regular rhythm.      Pulses: Normal pulses.      Heart sounds: Normal heart sounds, S1 normal and S2 normal. No murmur heard.  Pulmonary:      Effort: Pulmonary effort is normal.      Breath sounds: Normal breath sounds and air entry.   Abdominal:      General: Bowel sounds are normal.      Palpations: Abdomen is soft.      Tenderness: There is no abdominal tenderness. There is no right CVA tenderness, left CVA tenderness, guarding or rebound. Negative signs include Calvillo's sign, Rovsing's sign, McBurney's sign, psoas sign and obturator sign.   Musculoskeletal:      Cervical back: Neck supple.      Right lower leg: No edema.      Left lower leg: No edema.   Lymphadenopathy:      Cervical: No cervical adenopathy.      Right cervical: No superficial, deep or posterior cervical adenopathy.     Left cervical: No superficial, deep or posterior cervical adenopathy.   Skin:     General: Skin is warm and dry.      Capillary Refill: Capillary refill takes less than 2 seconds.   Neurological:      Mental Status: She is alert and oriented to person, place, and time.      Deep Tendon Reflexes:      Reflex Scores:       Patellar reflexes are 2+ on the right side and 2+ on the left side.  Psychiatric:         Attention and Perception: Attention and perception normal.         Mood and Affect: Mood and affect normal.         Speech: Speech normal.         Behavior: Behavior is cooperative.         Thought Content: Thought content normal.         Cognition and Memory: Cognition and memory normal.         Judgment: Judgment normal.        BMI is >= 25 and <30. (Overweight) The following options were offered after discussion;: nutrition  counseling/recommendations    Result Review :                 Assessment and Plan    Diagnoses and all orders for this visit:    1. Encounter for annual physical exam (Primary)  -     CBC (No Diff)  -     Comprehensive Metabolic Panel  -     Lipid Panel With LDL / HDL Ratio  -     TSH    2. Mixed hyperlipidemia  -     CBC (No Diff)  -     Comprehensive Metabolic Panel  -     Lipid Panel With LDL / HDL Ratio    3. Moderate episode of recurrent major depressive disorder    4. Hyperglycemia  -     Comprehensive Metabolic Panel  -     Hemoglobin A1c    5. Vitamin D insufficiency  -     Vitamin D,25-Hydroxy    Annual physical exam with hyperglycemia and hyperlipidemia management.  She is fasting today and will have a CBC, CMP, lipid profile,TSH and hemoglobin A1c.  Lynn will be notified of test results when completed.  Chronic and stable  Depression: Doing well with the Xanax medication.  No refills are needed.  Chronic and stable vitamin D insufficiency: I will check vitamin D level today.    Patient Counseling:  --Nutrition: Stressed importance of moderation in sodium/caffeine intake, saturated fat and cholesterol.  Discussed caloric balance, sufficient intake of fresh fruits, vegetables, fiber,   calcium, iron.  --Discussed the new recommendation against daily use of baby aspirin for primary prevention in low risk patients.  --Exercise: Stressed the importance of regular exercise by incorporating into daily routine.    --Substance Abuse: Discussed cessation/primary prevention of tobacco, alcohol, or other drug use; driving or other dangerous activities under the influence.    --Dental health: Discussed importance of regular tooth brushing, flossing, and dental visits.  -- Suggested having eyes and vision checked if needed or past due.  --Immunizations reviewed.  --Discussed benefits of screening colonoscopy.    I spent 30 minutes caring for Lynn on this date of service. This time includes time spent by me in the  following activities:preparing for the visit, obtaining and/or reviewing a separately obtained history, performing a medically appropriate examination and/or evaluation , counseling and educating the patient/family/caregiver, ordering medications, tests, or procedures, and documenting information in the medical record    Follow Up   Return in about 6 months (around 3/21/2024), or anxiety.  Patient was given instructions and counseling regarding her condition or for health maintenance advice. Please see specific information pulled into the AVS if appropriate.     [facility]  [address] [place] [phone]  Dept: [phone]  Dept Fax: [phone]  Loc: [phone]  Loc Fax: [phone]

## 2023-09-22 LAB
25(OH)D3+25(OH)D2 SERPL-MCNC: 76.9 NG/ML (ref 30–100)
ALBUMIN SERPL-MCNC: 4.9 G/DL (ref 3.5–5.2)
ALBUMIN/GLOB SERPL: 2.2 G/DL
ALP SERPL-CCNC: 81 U/L (ref 39–117)
ALT SERPL-CCNC: 30 U/L (ref 1–33)
AST SERPL-CCNC: 24 U/L (ref 1–32)
BILIRUB SERPL-MCNC: 0.4 MG/DL (ref 0–1.2)
BUN SERPL-MCNC: 7 MG/DL (ref 6–20)
BUN/CREAT SERPL: 10.3 (ref 7–25)
CALCIUM SERPL-MCNC: 10 MG/DL (ref 8.6–10.5)
CHLORIDE SERPL-SCNC: 100 MMOL/L (ref 98–107)
CHOLEST SERPL-MCNC: 157 MG/DL (ref 0–200)
CO2 SERPL-SCNC: 25.3 MMOL/L (ref 22–29)
CREAT SERPL-MCNC: 0.68 MG/DL (ref 0.57–1)
EGFRCR SERPLBLD CKD-EPI 2021: 102.4 ML/MIN/1.73
ERYTHROCYTE [DISTWIDTH] IN BLOOD BY AUTOMATED COUNT: 12.6 % (ref 12.3–15.4)
GLOBULIN SER CALC-MCNC: 2.2 GM/DL
GLUCOSE SERPL-MCNC: 86 MG/DL (ref 65–99)
HBA1C MFR BLD: 5 % (ref 4.8–5.6)
HCT VFR BLD AUTO: 38.8 % (ref 34–46.6)
HDLC SERPL-MCNC: 59 MG/DL (ref 40–60)
HGB BLD-MCNC: 13.7 G/DL (ref 12–15.9)
LDLC SERPL CALC-MCNC: 83 MG/DL (ref 0–100)
LDLC/HDLC SERPL: 1.39 {RATIO}
MCH RBC QN AUTO: 36.2 PG (ref 26.6–33)
MCHC RBC AUTO-ENTMCNC: 35.3 G/DL (ref 31.5–35.7)
MCV RBC AUTO: 102.6 FL (ref 79–97)
PLATELET # BLD AUTO: 195 10*3/MM3 (ref 140–450)
POTASSIUM SERPL-SCNC: 4.4 MMOL/L (ref 3.5–5.2)
PROT SERPL-MCNC: 7.1 G/DL (ref 6–8.5)
RBC # BLD AUTO: 3.78 10*6/MM3 (ref 3.77–5.28)
SODIUM SERPL-SCNC: 138 MMOL/L (ref 136–145)
TRIGL SERPL-MCNC: 81 MG/DL (ref 0–150)
TSH SERPL DL<=0.005 MIU/L-ACNC: 1.22 UIU/ML (ref 0.27–4.2)
VLDLC SERPL CALC-MCNC: 15 MG/DL (ref 5–40)
WBC # BLD AUTO: 4.67 10*3/MM3 (ref 3.4–10.8)

## 2023-09-22 NOTE — PROGRESS NOTES
I, Dr. Tomás Hartmann, have reviewed the notes, assessments, and/or procedures performed by Anabella BALBEUNA, that occurred within our practice at [facility] location, I concur with her documentation of Lynn Gomez. I have a current collaborative medical agreement with Anabella BALBUENA.

## 2023-09-25 DIAGNOSIS — F51.01 PRIMARY INSOMNIA: ICD-10-CM

## 2023-09-25 DIAGNOSIS — F41.1 GAD (GENERALIZED ANXIETY DISORDER): ICD-10-CM

## 2023-09-25 RX ORDER — ALPRAZOLAM 0.5 MG/1
0.5 TABLET ORAL NIGHTLY PRN
Qty: 30 TABLET | Refills: 0 | Status: SHIPPED | OUTPATIENT
Start: 2023-09-25

## 2023-10-11 DIAGNOSIS — F41.1 GAD (GENERALIZED ANXIETY DISORDER): ICD-10-CM

## 2023-10-11 DIAGNOSIS — F33.1 MODERATE EPISODE OF RECURRENT MAJOR DEPRESSIVE DISORDER: ICD-10-CM

## 2023-10-11 RX ORDER — DESVENLAFAXINE 100 MG/1
TABLET, EXTENDED RELEASE ORAL
Qty: 90 TABLET | Refills: 0 | Status: SHIPPED | OUTPATIENT
Start: 2023-10-11

## 2023-10-25 DIAGNOSIS — F41.1 GAD (GENERALIZED ANXIETY DISORDER): ICD-10-CM

## 2023-10-25 DIAGNOSIS — F51.01 PRIMARY INSOMNIA: ICD-10-CM

## 2023-10-25 RX ORDER — ALPRAZOLAM 0.5 MG/1
0.5 TABLET ORAL NIGHTLY PRN
Qty: 30 TABLET | Refills: 0 | Status: SHIPPED | OUTPATIENT
Start: 2023-10-25

## 2023-11-15 DIAGNOSIS — F51.01 PRIMARY INSOMNIA: ICD-10-CM

## 2023-11-15 DIAGNOSIS — F41.1 GAD (GENERALIZED ANXIETY DISORDER): ICD-10-CM

## 2023-11-15 RX ORDER — ALPRAZOLAM 0.5 MG/1
0.5 TABLET ORAL NIGHTLY PRN
Qty: 30 TABLET | Refills: 0 | Status: SHIPPED | OUTPATIENT
Start: 2023-11-15

## 2023-11-17 RX ORDER — ALPRAZOLAM 0.25 MG/1
0.25 TABLET ORAL NIGHTLY PRN
Qty: 30 TABLET | OUTPATIENT
Start: 2023-11-17

## 2023-12-23 DIAGNOSIS — E78.2 MIXED HYPERLIPIDEMIA: ICD-10-CM

## 2023-12-26 DIAGNOSIS — F51.01 PRIMARY INSOMNIA: ICD-10-CM

## 2023-12-26 DIAGNOSIS — F41.1 GAD (GENERALIZED ANXIETY DISORDER): ICD-10-CM

## 2023-12-26 DIAGNOSIS — F33.1 MODERATE EPISODE OF RECURRENT MAJOR DEPRESSIVE DISORDER: ICD-10-CM

## 2023-12-26 RX ORDER — ALPRAZOLAM 0.5 MG/1
0.5 TABLET ORAL NIGHTLY PRN
Qty: 30 TABLET | Refills: 0 | Status: SHIPPED | OUTPATIENT
Start: 2023-12-26

## 2023-12-26 RX ORDER — DESVENLAFAXINE 100 MG/1
TABLET, EXTENDED RELEASE ORAL
Qty: 90 TABLET | Refills: 0 | Status: SHIPPED | OUTPATIENT
Start: 2023-12-26

## 2023-12-26 RX ORDER — ROSUVASTATIN CALCIUM 5 MG/1
5 TABLET, COATED ORAL DAILY
Qty: 90 TABLET | Refills: 0 | Status: SHIPPED | OUTPATIENT
Start: 2023-12-26

## 2024-01-23 DIAGNOSIS — E55.9 VITAMIN D DEFICIENCY: ICD-10-CM

## 2024-01-23 DIAGNOSIS — F51.01 PRIMARY INSOMNIA: ICD-10-CM

## 2024-01-23 DIAGNOSIS — F41.1 GAD (GENERALIZED ANXIETY DISORDER): ICD-10-CM

## 2024-01-24 RX ORDER — ERGOCALCIFEROL 1.25 MG/1
CAPSULE ORAL
Qty: 12 CAPSULE | Refills: 1 | OUTPATIENT
Start: 2024-01-24

## 2024-01-24 RX ORDER — ALPRAZOLAM 0.5 MG/1
0.5 TABLET ORAL NIGHTLY PRN
Qty: 30 TABLET | Refills: 0 | Status: SHIPPED | OUTPATIENT
Start: 2024-01-24

## 2024-02-20 DIAGNOSIS — F41.1 GAD (GENERALIZED ANXIETY DISORDER): ICD-10-CM

## 2024-02-20 DIAGNOSIS — F51.01 PRIMARY INSOMNIA: ICD-10-CM

## 2024-02-21 RX ORDER — ALPRAZOLAM 0.5 MG/1
0.5 TABLET ORAL NIGHTLY PRN
Qty: 30 TABLET | Refills: 0 | Status: SHIPPED | OUTPATIENT
Start: 2024-02-21

## 2024-03-01 ENCOUNTER — TELEPHONE (OUTPATIENT)
Dept: FAMILY MEDICINE CLINIC | Facility: CLINIC | Age: 59
End: 2024-03-01
Payer: COMMERCIAL

## 2024-03-01 NOTE — TELEPHONE ENCOUNTER
HUB TO RELAY:   Tried to call the patient to reschedule her appointment on 03/21/20224, due to the provider being out of the office. No answer, was able to leave a detailed voicemail.

## 2024-03-04 NOTE — TELEPHONE ENCOUNTER
HUB TO RELAY: Tried to call the patient to reschedule her appointment on 03/21/20224, due to Anabella Farris PA-C being out of the office. No answer, was able to leave a detailed message. Voicemail #2.

## 2024-03-19 ENCOUNTER — TELEPHONE (OUTPATIENT)
Dept: FAMILY MEDICINE CLINIC | Facility: CLINIC | Age: 59
End: 2024-03-19

## 2024-03-19 NOTE — TELEPHONE ENCOUNTER
Can you please either schedule for pt to see Anabella tomorrow at 4:00 or Dr. Hartmann at 3:00 or advise her to go to urgent care for treatment.

## 2024-03-19 NOTE — TELEPHONE ENCOUNTER
DELETE AFTER REVIEWING: Send this encounter to the appropriate pool. See your Call Action Grid or Workflows for direction.    Caller: SpeakerLynn    Relationship: Self    Best call back number: [phone]    What medication are you requesting: ANTIBIOTIC     What are your current symptoms: URGENCY, FREQUENCY AND BURNING WITH URINATION     How long have you been experiencing symptoms: ABOUT 2 DAYS AGO     Have you had these symptoms before:    [x] Yes  [] No    Have you been treated for these symptoms before:   [x] Yes  [] No    If a prescription is needed, what is your preferred pharmacy and phone number:    [facility]  [address] [facility] [address] [address]   [phone]    PLEASE NOTIFY THE PATIENT ONCE THIS HAS BEEN SENT TO THE PHARMACY EITHER WAY

## 2024-03-21 DIAGNOSIS — F33.1 MODERATE EPISODE OF RECURRENT MAJOR DEPRESSIVE DISORDER: ICD-10-CM

## 2024-03-21 DIAGNOSIS — F41.1 GAD (GENERALIZED ANXIETY DISORDER): ICD-10-CM

## 2024-03-21 RX ORDER — DESVENLAFAXINE 100 MG/1
100 TABLET, EXTENDED RELEASE ORAL DAILY
Qty: 30 TABLET | Refills: 0 | Status: SHIPPED | OUTPATIENT
Start: 2024-03-21

## 2024-04-08 DIAGNOSIS — F51.01 PRIMARY INSOMNIA: ICD-10-CM

## 2024-04-08 DIAGNOSIS — F41.1 GAD (GENERALIZED ANXIETY DISORDER): ICD-10-CM

## 2024-04-08 RX ORDER — ALPRAZOLAM 0.5 MG/1
0.5 TABLET ORAL NIGHTLY PRN
Qty: 30 TABLET | Refills: 0 | Status: SHIPPED | OUTPATIENT
Start: 2024-04-08

## 2024-04-15 DIAGNOSIS — F41.1 GAD (GENERALIZED ANXIETY DISORDER): ICD-10-CM

## 2024-04-15 DIAGNOSIS — F33.1 MODERATE EPISODE OF RECURRENT MAJOR DEPRESSIVE DISORDER: ICD-10-CM

## 2024-04-15 RX ORDER — DESVENLAFAXINE 100 MG/1
100 TABLET, EXTENDED RELEASE ORAL DAILY
Qty: 30 TABLET | Refills: 0 | Status: SHIPPED | OUTPATIENT
Start: 2024-04-15

## 2024-05-08 DIAGNOSIS — F51.01 PRIMARY INSOMNIA: ICD-10-CM

## 2024-05-08 DIAGNOSIS — F41.1 GAD (GENERALIZED ANXIETY DISORDER): ICD-10-CM

## 2024-05-08 RX ORDER — ALPRAZOLAM 0.5 MG/1
TABLET ORAL
Qty: 30 TABLET | Refills: 0 | OUTPATIENT
Start: 2024-05-08

## 2024-05-18 DIAGNOSIS — F33.1 MODERATE EPISODE OF RECURRENT MAJOR DEPRESSIVE DISORDER: ICD-10-CM

## 2024-05-18 DIAGNOSIS — F41.1 GAD (GENERALIZED ANXIETY DISORDER): ICD-10-CM

## 2024-05-20 RX ORDER — DESVENLAFAXINE 100 MG/1
100 TABLET, EXTENDED RELEASE ORAL DAILY
Qty: 30 TABLET | Refills: 0 | Status: SHIPPED | OUTPATIENT
Start: 2024-05-20

## 2024-07-15 ENCOUNTER — TELEPHONE (OUTPATIENT)
Dept: FAMILY MEDICINE CLINIC | Facility: CLINIC | Age: 59
End: 2024-07-15
Payer: COMMERCIAL

## 2024-07-15 NOTE — TELEPHONE ENCOUNTER
HUB TO RELAY   Tried to call pt to est care with another provider due to Anabella retiring. Pt did not answer I left a vm.

## 2024-07-16 DIAGNOSIS — F33.1 MODERATE EPISODE OF RECURRENT MAJOR DEPRESSIVE DISORDER: ICD-10-CM

## 2024-07-16 DIAGNOSIS — F41.1 GAD (GENERALIZED ANXIETY DISORDER): ICD-10-CM

## 2024-07-16 RX ORDER — DESVENLAFAXINE 100 MG/1
100 TABLET, EXTENDED RELEASE ORAL DAILY
Qty: 30 TABLET | Refills: 0 | Status: SHIPPED | OUTPATIENT
Start: 2024-07-16

## 2024-09-08 DIAGNOSIS — F33.1 MODERATE EPISODE OF RECURRENT MAJOR DEPRESSIVE DISORDER: ICD-10-CM

## 2024-09-08 DIAGNOSIS — F41.1 GAD (GENERALIZED ANXIETY DISORDER): ICD-10-CM

## 2024-09-09 RX ORDER — DESVENLAFAXINE 100 MG/1
100 TABLET, EXTENDED RELEASE ORAL DAILY
Qty: 30 TABLET | Refills: 0 | OUTPATIENT
Start: 2024-09-09

## 2024-09-09 NOTE — TELEPHONE ENCOUNTER
Hub to relay     Pt will need to schedule appointment with provider in office before further refills can be filled. She can see  or ALEX Bonilla. Madie randle is not taking new patients at this time.

## 2024-12-09 ENCOUNTER — TELEPHONE (OUTPATIENT)
Dept: FAMILY MEDICINE CLINIC | Facility: CLINIC | Age: 59
End: 2024-12-09

## 2024-12-09 ENCOUNTER — TRANSCRIBE ORDERS (OUTPATIENT)
Dept: ADMINISTRATIVE | Facility: HOSPITAL | Age: 59
End: 2024-12-09
Payer: COMMERCIAL

## 2024-12-09 DIAGNOSIS — Z12.31 VISIT FOR SCREENING MAMMOGRAM: Primary | ICD-10-CM

## 2024-12-09 NOTE — TELEPHONE ENCOUNTER
Caller: SpeakerLynn    Relationship: Self    Best call back number:   Telephone Information:   Mobile [phone]     What orders are you requesting (i.e. lab or imaging): LABS    In what timeframe would the patient need to come in: ONE WEEK BEFORE APPOINTMENT ON 2/18/2025    Where will you receive your lab/imaging services: OFFICE    Additional notes: PATIENT PREVIOUSLY SAW TRA MCNEAL WHO OFF BOARDED AND SCHEDULED HER ESTABLISH-NEW PROVIDER VISIT WITH DR GALLARDO ON 2/18/25. PATIENT IS WANTING TO COME IN BEFORE THIS APPOINTMENT FOR LABS SO IS REQUESTING THE ORDERS BE PLACED. PLEASE CALLBACK.

## 2025-02-13 ENCOUNTER — HOSPITAL ENCOUNTER (OUTPATIENT)
Dept: MAMMOGRAPHY | Facility: HOSPITAL | Age: 60
Discharge: HOME | End: 2025-02-13
Admitting: OBSTETRICS & GYNECOLOGY
Payer: COMMERCIAL

## 2025-02-13 DIAGNOSIS — Z12.31 VISIT FOR SCREENING MAMMOGRAM: ICD-10-CM

## 2025-02-13 PROCEDURE — 77063 BREAST TOMOSYNTHESIS BI: CPT

## 2025-02-13 PROCEDURE — 77067 SCR MAMMO BI INCL CAD: CPT

## 2025-02-18 ENCOUNTER — OFFICE VISIT (OUTPATIENT)
Dept: FAMILY MEDICINE CLINIC | Facility: CLINIC | Age: 60
End: 2025-02-18
Payer: COMMERCIAL

## 2025-02-18 VITALS
SYSTOLIC BLOOD PRESSURE: 118 MMHG | DIASTOLIC BLOOD PRESSURE: 80 MMHG | TEMPERATURE: 98.4 F | OXYGEN SATURATION: 99 % | HEART RATE: 88 BPM | HEIGHT: 62 IN | WEIGHT: 137 LBS | BODY MASS INDEX: 25.21 KG/M2

## 2025-02-18 DIAGNOSIS — R73.9 HYPERGLYCEMIA: ICD-10-CM

## 2025-02-18 DIAGNOSIS — F33.1 MODERATE EPISODE OF RECURRENT MAJOR DEPRESSIVE DISORDER: ICD-10-CM

## 2025-02-18 DIAGNOSIS — F51.01 PRIMARY INSOMNIA: ICD-10-CM

## 2025-02-18 DIAGNOSIS — F41.1 GAD (GENERALIZED ANXIETY DISORDER): ICD-10-CM

## 2025-02-18 DIAGNOSIS — Z79.899 HIGH RISK MEDICATION USE: Primary | ICD-10-CM

## 2025-02-18 DIAGNOSIS — E55.9 VITAMIN D INSUFFICIENCY: ICD-10-CM

## 2025-02-18 DIAGNOSIS — E78.2 MIXED HYPERLIPIDEMIA: ICD-10-CM

## 2025-02-18 PROCEDURE — 99214 OFFICE O/P EST MOD 30 MIN: CPT | Performed by: FAMILY MEDICINE

## 2025-02-18 RX ORDER — DESVENLAFAXINE 100 MG/1
100 TABLET, EXTENDED RELEASE ORAL DAILY
Qty: 30 TABLET | Refills: 0 | Status: SHIPPED | OUTPATIENT
Start: 2025-02-18 | End: 2025-02-18 | Stop reason: SDUPTHER

## 2025-02-18 RX ORDER — DESVENLAFAXINE 100 MG/1
100 TABLET, EXTENDED RELEASE ORAL DAILY
Qty: 90 TABLET | Refills: 3 | Status: SHIPPED | OUTPATIENT
Start: 2025-02-18

## 2025-02-18 NOTE — PROGRESS NOTES
"  Subjective   Lynn Gomez is a 59 y.o. female who is here for   Chief Complaint   Patient presents with    Establish Care   Lynn Gomez presents to the office to establish care.  Patient has previously seen Anabella Farris in the past.  She was being managed for anxiety and depression, hyperlipidemia, hyperglycemia, and vitamin D deficiency.    Depression  Presents for follow-up visit. Symptoms include excessive worry, insomnia and nervousness/anxiety. Patient is not experiencing: shortness of breath and chest pain. The severity of symptoms is mild.  Her past medical history is significant for depression. Past treatments include non-SSRI antidepressants. The treatment provides significant relief. Compliance with medications is %.   Additional comments: Patient has been on Pristiq for quite some time.  Patient is wanting a refill of Pristiq.  Patient states on some occasions she will use the alprazolam to help her go to sleep.    Patient would also like to have her labs checked.      History of Present Illness      Review of Systems   Constitutional:  Negative for activity change, appetite change, chills, fatigue and fever.   Respiratory:  Negative for cough and shortness of breath.    Cardiovascular:  Negative for chest pain and leg swelling.   Gastrointestinal:  Negative for abdominal pain.   Psychiatric/Behavioral:  Positive for dysphoric mood and sleep disturbance. The patient is nervous/anxious and has insomnia.        Objective   Vitals:    02/18/25 1101   BP: 118/80   BP Location: Left arm   Patient Position: Sitting   Cuff Size: Adult   Pulse: 88   Temp: 98.4 °F (36.9 °C)   SpO2: 99%   Weight: 62.1 kg (137 lb)   Height: 157.5 cm (62.01\")      Physical Exam  Vitals and nursing note reviewed.   Constitutional:       Appearance: Normal appearance. She is normal weight.   HENT:      Head: Normocephalic and atraumatic.   Cardiovascular:      Rate and Rhythm: Normal rate and regular rhythm.      " Pulses: Normal pulses.      Heart sounds: No murmur heard.  Pulmonary:      Effort: Pulmonary effort is normal. No respiratory distress.      Breath sounds: Normal breath sounds. No wheezing.   Skin:     General: Skin is warm and dry.   Neurological:      General: No focal deficit present.      Mental Status: She is alert.   Psychiatric:         Mood and Affect: Mood normal.         Thought Content: Thought content normal.       Physical Exam        Assessment & Plan   Assessment & Plan    Diagnoses and all orders for this visit:    1. High risk medication use (Primary)  Stable.  Continue current medications.  -     TSH Rfx On Abnormal To Free T4; Future  -     CBC & Differential; Future    2. Mixed hyperlipidemia  Will repeat CMP and lipid panel.  -     Lipid Panel; Future  -     Comprehensive Metabolic Panel; Future    3. Vitamin D insufficiency  Vitamin D.  -     Vitamin D 25 hydroxy; Future    4. Primary insomnia  Will check baseline labs.  Discussed possible use of alprazolam.  Use very sparingly.  Will consider discontinuing in the future.  -     TSH Rfx On Abnormal To Free T4; Future  -     CBC & Differential; Future    5. Hyperglycemia  Will check baseline lab  -     TSH Rfx On Abnormal To Free T4; Future  -     CBC & Differential; Future  -     Hemoglobin A1c; Future    6. Moderate episode of recurrent major depressive disorder  We will continue Pristiq 100 mg daily.  -     desvenlafaxine (PRISTIQ) 100 MG 24 hr tablet; Take 1 tablet by mouth Daily. Needs an appointment before next refill  Dispense: 90 tablet; Refill: 3    7. TRISTIN (generalized anxiety disorder)  Stable.  Continue current medications.  -     desvenlafaxine (PRISTIQ) 100 MG 24 hr tablet; Take 1 tablet by mouth Daily. Needs an appointment before next refill  Dispense: 90 tablet; Refill: 3      Results      There are no Patient Instructions on file for this visit.    Medications Discontinued During This Encounter   Medication Reason     rosuvastatin (CRESTOR) 5 MG tablet Historical Med - Therapy completed    desvenlafaxine (PRISTIQ) 100 MG 24 hr tablet Reorder    desvenlafaxine (PRISTIQ) 100 MG 24 hr tablet Reorder        Return in about 3 months (around 5/18/2025), or Anxiety/depression., for Recheck.  BMI is >= 25 and <30. (Overweight) The following options were offered after discussion;: exercise counseling/recommendations and nutrition counseling/recommendations    Nolberto aCmeron MD  Family Practice  [place], [place].

## 2025-02-19 ENCOUNTER — PATIENT ROUNDING (BHMG ONLY) (OUTPATIENT)
Dept: FAMILY MEDICINE CLINIC | Facility: CLINIC | Age: 60
End: 2025-02-19
Payer: COMMERCIAL

## 2025-03-07 ENCOUNTER — OFFICE VISIT (OUTPATIENT)
Dept: OBSTETRICS AND GYNECOLOGY | Facility: CLINIC | Age: 60
End: 2025-03-07
Payer: COMMERCIAL

## 2025-03-07 VITALS
BODY MASS INDEX: 25.58 KG/M2 | DIASTOLIC BLOOD PRESSURE: 80 MMHG | SYSTOLIC BLOOD PRESSURE: 124 MMHG | WEIGHT: 139 LBS | HEIGHT: 62 IN

## 2025-03-07 DIAGNOSIS — Z01.419 CERVICAL SMEAR, AS PART OF ROUTINE GYNECOLOGICAL EXAMINATION: ICD-10-CM

## 2025-03-07 DIAGNOSIS — Z01.419 ROUTINE GYNECOLOGICAL EXAMINATION: Primary | ICD-10-CM

## 2025-03-07 DIAGNOSIS — Z11.51 SPECIAL SCREENING EXAMINATION FOR HUMAN PAPILLOMAVIRUS (HPV): ICD-10-CM

## 2025-03-07 NOTE — PROGRESS NOTES
GYN Annual Exam     CC- Here for annual exam.     Lynn MATTHEWS Speaker is a 59 y.o. female who presents for annual well woman exam. Periods are absent.  Postmenopausal.    OB History          1    Para   1    Term   1            AB        Living             SAB        IAB        Ectopic        Molar        Multiple        Live Births                    Current contraception: post menopausal status  History of abnormal Pap smear: no  Family history of uterine, colon or ovarian cancer: no  History of abnormal mammogram: no  Family history of breast cancer: no  Last Pap :  N/N    Past Medical History:   Diagnosis Date    Allergic mild    Anxiety     Depression        Past Surgical History:   Procedure Laterality Date     SECTION      ENDOMETRIAL ABLATION W/ NOVASURE      REDUCTION MAMMAPLASTY Bilateral              Current Outpatient Medications:     ALPRAZolam (XANAX) 0.5 MG tablet, Take 1 tablet by mouth At Night As Needed for Sleep. Needs an appointment before next refill, Disp: 30 tablet, Rfl: 0    desvenlafaxine (PRISTIQ) 100 MG 24 hr tablet, Take 1 tablet by mouth Daily. Needs an appointment before next refill, Disp: 90 tablet, Rfl: 3    estradiol (ESTRACE) 0.1 MG/GM vaginal cream, , Disp: , Rfl:     Allergies   Allergen Reactions    No Known Drug Allergy        Social History     Tobacco Use    Smoking status: Never    Smokeless tobacco: Never   Vaping Use    Vaping status: Never Used   Substance Use Topics    Alcohol use: Yes     Comment: occasional    Drug use: No         Family History   Problem Relation Age of Onset    Hypertension Father     Hypertension Paternal Grandmother        Review of Systems   Constitutional:  Negative for appetite change, fever and unexpected weight change.   HENT:  Negative for congestion and sore throat.    Respiratory:  Negative for cough and shortness of breath.    Cardiovascular:  Negative for chest pain and palpitations.   Gastrointestinal:  Negative  "for abdominal distention, abdominal pain, constipation, diarrhea, nausea and vomiting.   Endocrine: Negative.    Genitourinary:  Negative for dyspareunia, menstrual problem, pelvic pain and vaginal discharge.   Skin: Negative.    Neurological:  Negative for dizziness and syncope.   Hematological: Negative.    Psychiatric/Behavioral:  Negative for dysphoric mood and sleep disturbance. The patient is not nervous/anxious.      /80   Ht 157.5 cm (62.01\")   Wt 63 kg (139 lb)   Breastfeeding No   BMI 25.42 kg/m²     Physical Exam  Vitals and nursing note reviewed. Exam conducted with a chaperone present.   Constitutional:       Appearance: She is well-developed.   HENT:      Head: Normocephalic and atraumatic.   Neck:      Thyroid: No thyromegaly.   Cardiovascular:      Rate and Rhythm: Normal rate and regular rhythm.   Pulmonary:      Effort: Pulmonary effort is normal.      Breath sounds: Normal breath sounds.   Chest:   Breasts:     Breasts are symmetrical.      Right: No mass or nipple discharge.      Left: No mass or nipple discharge.   Abdominal:      General: Bowel sounds are normal. There is no distension.      Palpations: Abdomen is soft. There is no mass.      Tenderness: There is no abdominal tenderness. There is no guarding or rebound.   Genitourinary:     General: Normal vulva.      Exam position: Supine.      Labia:         Right: No lesion.         Left: No lesion.       Vagina: Normal.      Cervix: No cervical motion tenderness or discharge.      Uterus: Normal.       Adnexa:         Right: No mass.          Left: No mass.     Musculoskeletal:         General: Normal range of motion.      Cervical back: Normal range of motion and neck supple.   Skin:     General: Skin is warm and dry.   Neurological:      Mental Status: She is alert and oriented to person, place, and time.   Psychiatric:         Behavior: Behavior normal.         Thought Content: Thought content normal.         Judgment: Judgment " normal.     Diagnoses and all orders for this visit:    1. Routine gynecological examination (Primary)  -     POC Urinalysis Dipstick  -     IGP, Apt HPV,rfx 16 / 18,45    2. Cervical smear, as part of routine gynecological examination  -     POC Urinalysis Dipstick  -     IGP, Apt HPV,rfx 16 / 18,45    3. Special screening examination for human papillomavirus (HPV)  -     POC Urinalysis Dipstick  -     IGP, Apt HPV,rfx 16 / 18,45        Assessment     1) GYN annual well woman exam.   2) occasional overactive bladder-discussed rarity of advance.  Do not think daily medicine ongoing would be beneficial at this point.  If it becomes more frequent consider anticholinergic meds as needed.     Plan     1) Breast Health - Clinical breast exam & mammogram yearly, Self breast awareness monthly  2) Pap - done today  3) Smoking status - Lynn MATTHEWS Speaker  reports that she has never smoked. She has never used smokeless tobacco.  4) Follow up prn and one year    Encounter Diagnoses   Name Primary?    Routine gynecological examination Yes    Cervical smear, as part of routine gynecological examination     Special screening examination for human papillomavirus (HPV)          Gualberto Correa MD  3/7/2025  11:17 EST

## 2025-03-13 LAB
CYTOLOGIST CVX/VAG CYTO: ABNORMAL
CYTOLOGY CVX/VAG DOC CYTO: ABNORMAL
CYTOLOGY CVX/VAG DOC THIN PREP: ABNORMAL
DX ICD CODE: ABNORMAL
HPV I/H RISK 4 DNA CVX QL PROBE+SIG AMP: POSITIVE
HPV16 DNA CVX QL PROBE+SIG AMP: NEGATIVE
HPV18+45 E6+E7 MRNA CVX QL NAA+PROBE: NEGATIVE
Lab: ABNORMAL
OTHER STN SPEC: ABNORMAL
SERVICE CMNT-IMP: ABNORMAL
STAT OF ADQ CVX/VAG CYTO-IMP: ABNORMAL

## 2025-03-20 ENCOUNTER — TELEPHONE (OUTPATIENT)
Dept: OBSTETRICS AND GYNECOLOGY | Facility: CLINIC | Age: 60
End: 2025-03-20

## 2025-03-20 NOTE — TELEPHONE ENCOUNTER
Caller: SpeakerLynn    Relationship: Self    Best call back number: [phone]    Requested Prescriptions:   IMVEXXY- PT WAS GIVEN SAMPLES AT LAST APPT- TOLD TO CALL OFFICE IF SHE WOULD LIKE PRESCRIPTION SENT IN FOR THIS MEDICATION     Pharmacy where request should be sent:    [id number] - [place], [place] - [address] [address] [address] [address] - [phone]  - [phone]    Last office visit with prescribing clinician: 3/7/2025   Last telemedicine visit with prescribing clinician: Visit date not found   Next office visit with prescribing clinician: Visit date not found       Does the patient have less than a 3 day supply:  [x] Yes  [] No    Would you like a call back once the refill request has been completed: [x] Yes [] No    If the office needs to give you a call back, can they leave a voicemail: [x] Yes [] No    Debby Dumont Rep   03/20/25 09:53 EDT

## 2025-03-21 ENCOUNTER — TELEPHONE (OUTPATIENT)
Dept: FAMILY MEDICINE CLINIC | Facility: CLINIC | Age: 60
End: 2025-03-21

## 2025-03-21 NOTE — TELEPHONE ENCOUNTER
Patient sent a message advising that she would like to be put on Vitamin D. She would like a 90 day supply.

## 2025-03-21 NOTE — TELEPHONE ENCOUNTER
Caller: SpeakerLynn    Relationship: Self    Best call back number: [phone]    What medication are you requesting: VITAMIN D     If a prescription is needed, what is your preferred pharmacy and phone number: [id number] - [facility] [address] [address] AT [facility] [address] - [phone]  - [phone]      Additional notes:  PATIENT REQUESTS 90 DAY SUPPLY

## 2025-03-26 RX ORDER — ESTRADIOL 4 UG/1
1 INSERT VAGINAL 2 TIMES WEEKLY
Qty: 8 EACH | Refills: 11 | Status: SHIPPED | OUTPATIENT
Start: 2025-03-27

## 2025-04-01 RX ORDER — ESTRADIOL 10 UG/1
1 TABLET, FILM COATED VAGINAL 2 TIMES WEEKLY
Qty: 8 TABLET | Refills: 11 | Status: SHIPPED | OUTPATIENT
Start: 2025-04-03

## 2025-04-04 ENCOUNTER — OFFICE VISIT (OUTPATIENT)
Dept: FAMILY MEDICINE CLINIC | Facility: CLINIC | Age: 60
End: 2025-04-04
Payer: COMMERCIAL

## 2025-04-04 VITALS
HEIGHT: 62 IN | BODY MASS INDEX: 24.84 KG/M2 | HEART RATE: 81 BPM | OXYGEN SATURATION: 98 % | SYSTOLIC BLOOD PRESSURE: 118 MMHG | DIASTOLIC BLOOD PRESSURE: 80 MMHG | WEIGHT: 135 LBS | TEMPERATURE: 97.2 F

## 2025-04-04 DIAGNOSIS — R42 DIZZINESS: Primary | ICD-10-CM

## 2025-04-04 DIAGNOSIS — E78.2 MIXED HYPERLIPIDEMIA: ICD-10-CM

## 2025-04-04 PROCEDURE — 93000 ELECTROCARDIOGRAM COMPLETE: CPT | Performed by: NURSE PRACTITIONER

## 2025-04-04 PROCEDURE — 99214 OFFICE O/P EST MOD 30 MIN: CPT | Performed by: NURSE PRACTITIONER

## 2025-04-04 NOTE — PROGRESS NOTES
Patient ID: Lynn MATTHEWS Speaker is a 59 y.o. female     Patient Care Team:  Nolberto Cameron MD as PCP - General (Emergency Medicine)    Subjective     Chief Complaint   Patient presents with    Dizziness     Wednesday night woke up dizzy - no ear pressure or nasal congestion.        History of Present Illness      History of Present Illness  The patient presents for evaluation of dizziness.  Also request to review recent lab results.    She reports experiencing persistent dizziness, which she describes as a sensation of being in a zone. This symptom began abruptly on Wednesday night, accompanied by a sensation of room spinning and nausea. The dizziness is constant and not influenced by changes in position or head movements. She also reports feeling fatigued despite having adequate sleep the previous night. She has not been exposed to any sick individuals recently. She does not monitor her blood pressure at home and has no known issues related to it. She has not started any new medications recently. She maintains good hydration and has not experienced any recent travel. She has not had any recent ear pain, sinus congestion, vision changes, chest pain, or shortness of breath. She has a past medical history of vertigo in 2016 but has not experienced any similar episodes since then.    She has never been prescribed cholesterol medication. A few years ago, she experienced a significant increase in her cholesterol levels, which led to substantial weight loss. Her weight decreased to approximately 120 pounds but has since increased to 135 pounds. She is uncertain if her current weight gain is contributing to her symptoms. She has not made any recent dietary changes.      Results  Laboratory Studies  Hemoglobin A1c is normal. Vitamin D is normal. CMP reveals normal kidney and liver function test. Thyroid function test is normal. LDL cholesterol is elevated at 153. HDL cholesterol is 78.       She denies any  complaints of fever, chills, cough, chest pain, shortness of air, abdominal pain, nausea, or any other concerns.     The following portions of the patient's history were reviewed and updated as appropriate: allergies, current medications, past family history, past medical history, past social history, past surgical history and problem list.       ROS    Vitals:    04/04/25 1335   BP: 118/80   Pulse: 81   Temp:    SpO2: 98%       Documented weights    04/04/25 1256   Weight: 61.2 kg (135 lb)     Body mass index is 24.68 kg/m².    Results for orders placed or performed in visit on 03/07/25   HPV Genotypes 16 / 18,45 - ,    Collection Time: 03/07/25 10:56 AM    ThinPrep  Specimen gordo   Result Value Ref Range    HPV Genotype, 16 Negative Negative    HPV Genotype 18,45 Negative Negative   IGP, Apt HPV,rfx 16 / 18,45    Collection Time: 03/07/25 10:56 AM    Specimen: Cervix; ThinPrep Vial    ThinPrep  Specimen gordo   Result Value Ref Range    Diagnosis Comment     Specimen adequacy: Comment     Clinician Provided ICD-10: Comment     Performed by: Comment     QC reviewed by: Comment     . .     Note: Comment     Method: Comment     HPV Aptima Positive (A) Negative   POC Urinalysis Dipstick    Collection Time: 03/07/25 10:59 AM    Specimen: Urine   Result Value Ref Range    Color Yellow Yellow, Straw, Dark Yellow, [place]    Clarity, UA Clear Clear    Glucose, UA Negative Negative mg/dL    Bilirubin Negative Negative    Ketones, UA Negative Negative    Specific Gravity  1.005 1.005 - 1.030    Blood, UA Negative Negative    pH, Urine 5.0 5.0 - 8.0    Protein, POC Negative Negative mg/dL    Urobilinogen, UA Normal Normal, 0.2 E.U./dL    Leukocytes Negative Negative    Nitrite, UA Negative Negative         The 10-year ASCVD risk score (Stiven DK, et al., 2019) is: 1.9%    Values used to calculate the score:      Age: 59 years      Sex: Female      Is Non- : No      Diabetic: No      Tobacco smoker: No       Systolic Blood Pressure: 118 mmHg      Is BP treated: No      HDL Cholesterol: 78 mg/dL      Total Cholesterol: 248 mg/dL    Objective       Vitals:    04/04/25 1256 04/04/25 1332 04/04/25 1334 04/04/25 1335   Orthostatic BP: 118/70 120/86 120/80    Orthostatic Pulse: 80 75 81    Patient Position: Sitting Lying Sitting Standing      Physical Exam  Vitals reviewed.   Constitutional:       General: She is not in acute distress.     Comments: fatigued   HENT:      Head: Normocephalic and atraumatic.      Right Ear: Tympanic membrane normal.      Left Ear: Tympanic membrane normal.      Nose: No congestion.      Mouth/Throat:      Pharynx: No posterior oropharyngeal erythema.   Eyes:      Extraocular Movements: Extraocular movements intact.      Pupils: Pupils are equal, round, and reactive to light.   Neck:      Vascular: No carotid bruit.   Cardiovascular:      Rate and Rhythm: Normal rate and regular rhythm.      Heart sounds: No murmur heard.  Pulmonary:      Effort: Pulmonary effort is normal.      Breath sounds: Normal breath sounds. No wheezing or rhonchi.   Abdominal:      Palpations: Abdomen is soft.   Musculoskeletal:      Cervical back: Normal range of motion.      Right lower leg: No edema.      Left lower leg: No edema.   Lymphadenopathy:      Cervical: No cervical adenopathy.   Skin:     Findings: No rash.   Neurological:      General: No focal deficit present.      Mental Status: She is oriented to person, place, and time.      Cranial Nerves: No cranial nerve deficit.      Motor: No weakness.      Gait: Gait normal.      Comments: No increased dizziness with laying down and turning head left and right.     Psychiatric:         Mood and Affect: Mood normal.         Behavior: Behavior normal.         ECG 12 Lead    Date/Time: 4/4/2025 1:47 PM  Performed by: Madie Heath APRN    Authorized by: Madie Heath APRN  Previous ECG: no previous ECG available  Rhythm: sinus rhythm  Rate: normal  Conduction:  conduction normal  ST Segments: ST segments normal  T Waves: T waves normal  QRS axis: normal  Other: no other findings    Clinical impression: normal ECG          Physical Exam      Assessment & Plan     Assessment/Plan     Assessment & Plan  1. Dizziness.  The etiology of the dizziness remains uncertain, with no discernible cause identified during the examination. Blood pressure readings are within normal limits, and cardiac auscultation reveals normal heart sounds. The possibility of an impending illness can not be ruled out at this stage. An EKG will be conducted to further evaluate the cardiac function. Additionally, blood pressure will be monitored in various positions, including supine, sitting, and standing, to ensure stability.        Diagnoses and all orders for this visit:    1. Dizziness (Primary)  -     ECG 12 Lead   EKG normal.  Orthostatics normal.  Exam normal.     May be beginnings of acute illness. She does not feel bad enough to need prn med such as meclizine.  Instructed home to rest.  Make sure she is drinking plenty of fluids.  Change positions slowly.  May try over-the-counter Flonase nasal spray in case sinus issue causing dizziness.  She is let us know if symptoms worsen or do not improve.    2. Mixed hyperlipidemia  2. Elevated LDL cholesterol.  The LDL cholesterol level is elevated at 153 mg/dL, which is a recurrence of a previously noted issue. However, the HDL cholesterol level is commendably high at 78 mg/dL, contributing positively to the overall health status. The risk of developing coronary vascular disease is currently low at 1.9 percent. The patient is advised to manage the condition through dietary modifications and regular exercise, with a particular emphasis on limiting cholesterol intake.        Follow Up:  Return if symptoms worsen or fail to improve.    In the meantime, instructed to contact us sooner for any problems or concerns.    Patient was given instructions and  counseling regarding condition or for health maintenance advice.  Please see specific information pulled into the AVS if appropriate.      Patient or patient representative verbalized consent for the use of Ambient Listening during the visit with  ALEX Mena for chart documentation. 4/4/2025  13:50 EDT    ALEX Mena  Family Medicine  [facility]  04/04/25  13:50 EDT

## 2025-04-23 RX ORDER — ESTRADIOL 0.1 MG/G
CREAM VAGINAL
Qty: 1 EACH | Refills: 2 | Status: SHIPPED | OUTPATIENT
Start: 2025-04-23